# Patient Record
Sex: MALE | Race: WHITE | Employment: FULL TIME | URBAN - METROPOLITAN AREA
[De-identification: names, ages, dates, MRNs, and addresses within clinical notes are randomized per-mention and may not be internally consistent; named-entity substitution may affect disease eponyms.]

---

## 2022-02-06 ENCOUNTER — APPOINTMENT (OUTPATIENT)
Dept: CT IMAGING | Age: 67
DRG: 101 | End: 2022-02-06
Payer: MEDICARE

## 2022-02-06 ENCOUNTER — HOSPITAL ENCOUNTER (INPATIENT)
Age: 67
LOS: 2 days | Discharge: HOME | DRG: 101 | End: 2022-02-08
Attending: EMERGENCY MEDICINE | Admitting: INTERNAL MEDICINE
Payer: MEDICARE

## 2022-02-06 ENCOUNTER — APPOINTMENT (OUTPATIENT)
Dept: GENERAL RADIOLOGY | Age: 67
DRG: 101 | End: 2022-02-06
Payer: MEDICARE

## 2022-02-06 DIAGNOSIS — E11.65 TYPE 2 DIABETES MELLITUS WITH HYPERGLYCEMIA, WITH LONG-TERM CURRENT USE OF INSULIN (HCC): ICD-10-CM

## 2022-02-06 DIAGNOSIS — R55 SYNCOPE AND COLLAPSE: Primary | ICD-10-CM

## 2022-02-06 DIAGNOSIS — R09.02 HYPOXIA: ICD-10-CM

## 2022-02-06 DIAGNOSIS — E87.20 LACTIC ACIDOSIS: ICD-10-CM

## 2022-02-06 DIAGNOSIS — Z79.4 TYPE 2 DIABETES MELLITUS WITH HYPERGLYCEMIA, WITH LONG-TERM CURRENT USE OF INSULIN (HCC): ICD-10-CM

## 2022-02-06 LAB
A/G RATIO: 1.4 (ref 1.1–2.2)
ALBUMIN SERPL-MCNC: 4.5 G/DL (ref 3.4–5)
ALP BLD-CCNC: 98 U/L (ref 40–129)
ALT SERPL-CCNC: 25 U/L (ref 10–40)
ANION GAP SERPL CALCULATED.3IONS-SCNC: 10 MMOL/L (ref 3–16)
ANION GAP SERPL CALCULATED.3IONS-SCNC: 27 MMOL/L (ref 3–16)
APTT: 40.9 SEC (ref 26.2–38.6)
AST SERPL-CCNC: 20 U/L (ref 15–37)
BASE EXCESS VENOUS: -7.9 MMOL/L
BILIRUB SERPL-MCNC: 0.7 MG/DL (ref 0–1)
BILIRUBIN DIRECT: <0.2 MG/DL (ref 0–0.3)
BILIRUBIN, INDIRECT: NORMAL MG/DL (ref 0–1)
BUN BLDV-MCNC: 18 MG/DL (ref 7–20)
BUN BLDV-MCNC: 25 MG/DL (ref 7–20)
CALCIUM SERPL-MCNC: 7.8 MG/DL (ref 8.3–10.6)
CALCIUM SERPL-MCNC: 9.2 MG/DL (ref 8.3–10.6)
CARBOXYHEMOGLOBIN: 1.2 %
CHLORIDE BLD-SCNC: 100 MMOL/L (ref 99–110)
CHLORIDE BLD-SCNC: 92 MMOL/L (ref 99–110)
CO2: 16 MMOL/L (ref 21–32)
CO2: 25 MMOL/L (ref 21–32)
CREAT SERPL-MCNC: 0.9 MG/DL (ref 0.8–1.3)
CREAT SERPL-MCNC: 1.3 MG/DL (ref 0.8–1.3)
ETHANOL: NORMAL MG/DL (ref 0–0.08)
GFR AFRICAN AMERICAN: >60
GFR AFRICAN AMERICAN: >60
GFR NON-AFRICAN AMERICAN: 55
GFR NON-AFRICAN AMERICAN: >60
GLUCOSE BLD-MCNC: 131 MG/DL (ref 70–99)
GLUCOSE BLD-MCNC: 198 MG/DL (ref 70–99)
GLUCOSE BLD-MCNC: 230 MG/DL (ref 70–99)
GLUCOSE BLD-MCNC: 262 MG/DL (ref 70–99)
GLUCOSE BLD-MCNC: 87 MG/DL (ref 70–99)
HCO3 VENOUS: 20 MMOL/L (ref 23–29)
INR BLD: 0.95 (ref 0.88–1.12)
LACTIC ACID, SEPSIS: 12.6 MMOL/L (ref 0.4–1.9)
LACTIC ACID, SEPSIS: 3.2 MMOL/L (ref 0.4–1.9)
LACTIC ACID: 1.3 MMOL/L (ref 0.4–2)
METHEMOGLOBIN VENOUS: 0.5 %
O2 SAT, VEN: 56 %
O2 THERAPY: ABNORMAL
PCO2, VEN: 47.6 MMHG (ref 40–50)
PERFORMED ON: ABNORMAL
PERFORMED ON: ABNORMAL
PERFORMED ON: NORMAL
PH VENOUS: 7.23 (ref 7.35–7.45)
PO2, VEN: 35 MMHG
POTASSIUM REFLEX MAGNESIUM: 3.8 MMOL/L (ref 3.5–5.1)
POTASSIUM REFLEX MAGNESIUM: 4.2 MMOL/L (ref 3.5–5.1)
PROCALCITONIN: 0.05 NG/ML (ref 0–0.15)
PROTHROMBIN TIME: 10.7 SEC (ref 9.9–12.7)
SARS-COV-2, NAAT: NOT DETECTED
SODIUM BLD-SCNC: 135 MMOL/L (ref 136–145)
SODIUM BLD-SCNC: 135 MMOL/L (ref 136–145)
TCO2 CALC VENOUS: 21 MMOL/L
TOTAL CK: 405 U/L (ref 39–308)
TOTAL PROTEIN: 7.8 G/DL (ref 6.4–8.2)
TROPONIN: <0.01 NG/ML

## 2022-02-06 PROCEDURE — 71045 X-RAY EXAM CHEST 1 VIEW: CPT

## 2022-02-06 PROCEDURE — 2580000003 HC RX 258: Performed by: INTERNAL MEDICINE

## 2022-02-06 PROCEDURE — 72125 CT NECK SPINE W/O DYE: CPT

## 2022-02-06 PROCEDURE — 83036 HEMOGLOBIN GLYCOSYLATED A1C: CPT

## 2022-02-06 PROCEDURE — 71260 CT THORAX DX C+: CPT

## 2022-02-06 PROCEDURE — 99284 EMERGENCY DEPT VISIT MOD MDM: CPT

## 2022-02-06 PROCEDURE — 90471 IMMUNIZATION ADMIN: CPT | Performed by: EMERGENCY MEDICINE

## 2022-02-06 PROCEDURE — G0378 HOSPITAL OBSERVATION PER HR: HCPCS

## 2022-02-06 PROCEDURE — 87635 SARS-COV-2 COVID-19 AMP PRB: CPT

## 2022-02-06 PROCEDURE — 36415 COLL VENOUS BLD VENIPUNCTURE: CPT

## 2022-02-06 PROCEDURE — 96372 THER/PROPH/DIAG INJ SC/IM: CPT

## 2022-02-06 PROCEDURE — 2580000003 HC RX 258: Performed by: EMERGENCY MEDICINE

## 2022-02-06 PROCEDURE — 96365 THER/PROPH/DIAG IV INF INIT: CPT

## 2022-02-06 PROCEDURE — 83605 ASSAY OF LACTIC ACID: CPT

## 2022-02-06 PROCEDURE — 6370000000 HC RX 637 (ALT 250 FOR IP): Performed by: INTERNAL MEDICINE

## 2022-02-06 PROCEDURE — 85730 THROMBOPLASTIN TIME PARTIAL: CPT

## 2022-02-06 PROCEDURE — 85610 PROTHROMBIN TIME: CPT

## 2022-02-06 PROCEDURE — 82803 BLOOD GASES ANY COMBINATION: CPT

## 2022-02-06 PROCEDURE — 93005 ELECTROCARDIOGRAM TRACING: CPT | Performed by: EMERGENCY MEDICINE

## 2022-02-06 PROCEDURE — 6360000002 HC RX W HCPCS: Performed by: INTERNAL MEDICINE

## 2022-02-06 PROCEDURE — 82077 ASSAY SPEC XCP UR&BREATH IA: CPT

## 2022-02-06 PROCEDURE — 96367 TX/PROPH/DG ADDL SEQ IV INF: CPT

## 2022-02-06 PROCEDURE — 80053 COMPREHEN METABOLIC PANEL: CPT

## 2022-02-06 PROCEDURE — 84484 ASSAY OF TROPONIN QUANT: CPT

## 2022-02-06 PROCEDURE — 6360000004 HC RX CONTRAST MEDICATION: Performed by: EMERGENCY MEDICINE

## 2022-02-06 PROCEDURE — 6360000002 HC RX W HCPCS: Performed by: EMERGENCY MEDICINE

## 2022-02-06 PROCEDURE — 84145 PROCALCITONIN (PCT): CPT

## 2022-02-06 PROCEDURE — 1200000000 HC SEMI PRIVATE

## 2022-02-06 PROCEDURE — 90715 TDAP VACCINE 7 YRS/> IM: CPT | Performed by: EMERGENCY MEDICINE

## 2022-02-06 PROCEDURE — 70450 CT HEAD/BRAIN W/O DYE: CPT

## 2022-02-06 PROCEDURE — 82550 ASSAY OF CK (CPK): CPT

## 2022-02-06 RX ORDER — ACETAMINOPHEN 650 MG/1
650 SUPPOSITORY RECTAL EVERY 6 HOURS PRN
Status: DISCONTINUED | OUTPATIENT
Start: 2022-02-06 | End: 2022-02-08 | Stop reason: HOSPADM

## 2022-02-06 RX ORDER — HYDROCHLOROTHIAZIDE 25 MG/1
25 TABLET ORAL DAILY
Status: DISCONTINUED | OUTPATIENT
Start: 2022-02-06 | End: 2022-02-08 | Stop reason: HOSPADM

## 2022-02-06 RX ORDER — DEXTROSE MONOHYDRATE 25 G/50ML
12.5 INJECTION, SOLUTION INTRAVENOUS PRN
Status: DISCONTINUED | OUTPATIENT
Start: 2022-02-06 | End: 2022-02-08 | Stop reason: HOSPADM

## 2022-02-06 RX ORDER — DEXTROSE MONOHYDRATE 50 MG/ML
100 INJECTION, SOLUTION INTRAVENOUS PRN
Status: DISCONTINUED | OUTPATIENT
Start: 2022-02-06 | End: 2022-02-08 | Stop reason: HOSPADM

## 2022-02-06 RX ORDER — ONDANSETRON 2 MG/ML
4 INJECTION INTRAMUSCULAR; INTRAVENOUS EVERY 6 HOURS PRN
Status: DISCONTINUED | OUTPATIENT
Start: 2022-02-06 | End: 2022-02-08 | Stop reason: HOSPADM

## 2022-02-06 RX ORDER — M-VIT,TX,IRON,MINS/CALC/FOLIC 27MG-0.4MG
1 TABLET ORAL DAILY
COMMUNITY

## 2022-02-06 RX ORDER — POTASSIUM CHLORIDE 7.45 MG/ML
10 INJECTION INTRAVENOUS PRN
Status: DISCONTINUED | OUTPATIENT
Start: 2022-02-06 | End: 2022-02-08 | Stop reason: HOSPADM

## 2022-02-06 RX ORDER — NICOTINE POLACRILEX 4 MG
15 LOZENGE BUCCAL PRN
Status: DISCONTINUED | OUTPATIENT
Start: 2022-02-06 | End: 2022-02-08 | Stop reason: HOSPADM

## 2022-02-06 RX ORDER — ASPIRIN 325 MG
325 TABLET ORAL DAILY
Status: DISCONTINUED | OUTPATIENT
Start: 2022-02-06 | End: 2022-02-08 | Stop reason: HOSPADM

## 2022-02-06 RX ORDER — HYDROCHLOROTHIAZIDE 25 MG/1
25 TABLET ORAL DAILY
COMMUNITY
Start: 2021-09-07

## 2022-02-06 RX ORDER — INSULIN LISPRO 100 [IU]/ML
INJECTION, SOLUTION INTRAVENOUS; SUBCUTANEOUS
COMMUNITY
Start: 2022-01-13

## 2022-02-06 RX ORDER — OMEPRAZOLE 20 MG/1
20 CAPSULE, DELAYED RELEASE ORAL 2 TIMES DAILY PRN
COMMUNITY
Start: 2021-03-01

## 2022-02-06 RX ORDER — LEVETIRACETAM 10 MG/ML
1000 INJECTION INTRAVASCULAR ONCE
Status: COMPLETED | OUTPATIENT
Start: 2022-02-06 | End: 2022-02-06

## 2022-02-06 RX ORDER — SODIUM CHLORIDE 9 MG/ML
25 INJECTION, SOLUTION INTRAVENOUS PRN
Status: DISCONTINUED | OUTPATIENT
Start: 2022-02-06 | End: 2022-02-08 | Stop reason: HOSPADM

## 2022-02-06 RX ORDER — LISINOPRIL 40 MG/1
40 TABLET ORAL DAILY
COMMUNITY
Start: 2021-02-22

## 2022-02-06 RX ORDER — INSULIN GLARGINE 100 [IU]/ML
INJECTION, SOLUTION SUBCUTANEOUS
COMMUNITY
Start: 2021-12-01

## 2022-02-06 RX ORDER — DULAGLUTIDE 1.5 MG/.5ML
1.5 INJECTION, SOLUTION SUBCUTANEOUS
COMMUNITY
Start: 2021-08-04

## 2022-02-06 RX ORDER — LISINOPRIL 40 MG/1
40 TABLET ORAL DAILY
Status: DISCONTINUED | OUTPATIENT
Start: 2022-02-06 | End: 2022-02-08 | Stop reason: HOSPADM

## 2022-02-06 RX ORDER — CARVEDILOL 25 MG/1
25 TABLET ORAL 2 TIMES DAILY
Status: DISCONTINUED | OUTPATIENT
Start: 2022-02-06 | End: 2022-02-08 | Stop reason: HOSPADM

## 2022-02-06 RX ORDER — PROMETHAZINE HYDROCHLORIDE 25 MG/1
12.5 TABLET ORAL EVERY 6 HOURS PRN
Status: DISCONTINUED | OUTPATIENT
Start: 2022-02-06 | End: 2022-02-08 | Stop reason: HOSPADM

## 2022-02-06 RX ORDER — ACETAMINOPHEN 325 MG/1
650 TABLET ORAL EVERY 6 HOURS PRN
Status: DISCONTINUED | OUTPATIENT
Start: 2022-02-06 | End: 2022-02-08 | Stop reason: HOSPADM

## 2022-02-06 RX ORDER — POTASSIUM CHLORIDE 20 MEQ/1
40 TABLET, EXTENDED RELEASE ORAL PRN
Status: DISCONTINUED | OUTPATIENT
Start: 2022-02-06 | End: 2022-02-08 | Stop reason: HOSPADM

## 2022-02-06 RX ORDER — INSULIN GLARGINE 100 [IU]/ML
25 INJECTION, SOLUTION SUBCUTANEOUS NIGHTLY
Status: DISCONTINUED | OUTPATIENT
Start: 2022-02-06 | End: 2022-02-08 | Stop reason: HOSPADM

## 2022-02-06 RX ORDER — SODIUM CHLORIDE 0.9 % (FLUSH) 0.9 %
10 SYRINGE (ML) INJECTION EVERY 12 HOURS SCHEDULED
Status: DISCONTINUED | OUTPATIENT
Start: 2022-02-06 | End: 2022-02-08 | Stop reason: HOSPADM

## 2022-02-06 RX ORDER — 0.9 % SODIUM CHLORIDE 0.9 %
30 INTRAVENOUS SOLUTION INTRAVENOUS ONCE
Status: COMPLETED | OUTPATIENT
Start: 2022-02-06 | End: 2022-02-06

## 2022-02-06 RX ORDER — ATORVASTATIN CALCIUM 80 MG/1
80 TABLET, FILM COATED ORAL DAILY
Status: DISCONTINUED | OUTPATIENT
Start: 2022-02-06 | End: 2022-02-08 | Stop reason: HOSPADM

## 2022-02-06 RX ORDER — ASPIRIN 325 MG
325 TABLET ORAL DAILY
COMMUNITY
Start: 2021-08-02

## 2022-02-06 RX ORDER — SODIUM CHLORIDE 0.9 % (FLUSH) 0.9 %
10 SYRINGE (ML) INJECTION PRN
Status: DISCONTINUED | OUTPATIENT
Start: 2022-02-06 | End: 2022-02-08 | Stop reason: HOSPADM

## 2022-02-06 RX ORDER — PANTOPRAZOLE SODIUM 40 MG/1
40 TABLET, DELAYED RELEASE ORAL
Status: DISCONTINUED | OUTPATIENT
Start: 2022-02-07 | End: 2022-02-08 | Stop reason: HOSPADM

## 2022-02-06 RX ORDER — ATORVASTATIN CALCIUM 80 MG/1
80 TABLET, FILM COATED ORAL DAILY
COMMUNITY
Start: 2021-04-12

## 2022-02-06 RX ORDER — CARVEDILOL 25 MG/1
25 TABLET ORAL 2 TIMES DAILY
COMMUNITY
Start: 2021-12-09

## 2022-02-06 RX ORDER — MAGNESIUM SULFATE IN WATER 40 MG/ML
2000 INJECTION, SOLUTION INTRAVENOUS PRN
Status: DISCONTINUED | OUTPATIENT
Start: 2022-02-06 | End: 2022-02-08 | Stop reason: HOSPADM

## 2022-02-06 RX ADMIN — TETANUS TOXOID, REDUCED DIPHTHERIA TOXOID AND ACELLULAR PERTUSSIS VACCINE, ADSORBED 0.5 ML: 5; 2.5; 8; 8; 2.5 SUSPENSION INTRAMUSCULAR at 13:40

## 2022-02-06 RX ADMIN — INSULIN LISPRO 3 UNITS: 100 INJECTION, SOLUTION INTRAVENOUS; SUBCUTANEOUS at 23:22

## 2022-02-06 RX ADMIN — LISINOPRIL 40 MG: 40 TABLET ORAL at 21:56

## 2022-02-06 RX ADMIN — CEFTRIAXONE 1000 MG: 1 INJECTION, POWDER, FOR SOLUTION INTRAMUSCULAR; INTRAVENOUS at 13:21

## 2022-02-06 RX ADMIN — Medication 10 ML: at 21:58

## 2022-02-06 RX ADMIN — SODIUM CHLORIDE 3033 ML: 9 INJECTION, SOLUTION INTRAVENOUS at 12:51

## 2022-02-06 RX ADMIN — AZITHROMYCIN 500 MG: 500 INJECTION, POWDER, LYOPHILIZED, FOR SOLUTION INTRAVENOUS at 13:55

## 2022-02-06 RX ADMIN — PROMETHAZINE HYDROCHLORIDE 12.5 MG: 25 TABLET ORAL at 21:53

## 2022-02-06 RX ADMIN — IOPAMIDOL 75 ML: 755 INJECTION, SOLUTION INTRAVENOUS at 13:03

## 2022-02-06 RX ADMIN — INSULIN GLARGINE 25 UNITS: 100 INJECTION, SOLUTION SUBCUTANEOUS at 22:02

## 2022-02-06 RX ADMIN — ENOXAPARIN SODIUM 40 MG: 100 INJECTION SUBCUTANEOUS at 22:00

## 2022-02-06 RX ADMIN — HYDROCHLOROTHIAZIDE 25 MG: 25 TABLET ORAL at 21:56

## 2022-02-06 RX ADMIN — ASPIRIN 325 MG ORAL TABLET 325 MG: 325 PILL ORAL at 21:57

## 2022-02-06 RX ADMIN — CARVEDILOL 25 MG: 25 TABLET, FILM COATED ORAL at 21:56

## 2022-02-06 RX ADMIN — LEVETIRACETAM 1000 MG: 10 INJECTION, SOLUTION INTRAVENOUS at 13:00

## 2022-02-06 RX ADMIN — ATORVASTATIN CALCIUM 80 MG: 80 TABLET, FILM COATED ORAL at 21:56

## 2022-02-06 ASSESSMENT — PAIN SCALES - GENERAL: PAINLEVEL_OUTOF10: 0

## 2022-02-06 NOTE — ED PROVIDER NOTES
[address] [facility]      Pt Name: Miriam Valladares  MRN: [id number]  [place] 1955  Date of evaluation: 2/6/2022  Provider: Elena Cueto DO    CHIEF COMPLAINT       Chief Complaint   Patient presents with    Loss of Consciousness     unwitnessed. found at home. O2 sat 80s on RA. placed on NRB by squad. tounge lac noted. 99% on RA in ED. pt is awake and alert in ED. post ictal. pt reports hx of diabetes. denies seizure hx.  Head Injury         HISTORY OF PRESENT ILLNESS   (Location/Symptom, Timing/Onset, Context/Setting, Quality, Duration, Modifying Factors, Severity)  Note limiting factors. Miriam Valladares is a 69 y.o. male who presents to the emergency department with a complaint of [place]ub[place] seizure. The patient states that he did not have a seizure. He states that he got up this morning at 6 AM and checked his blood sugar. He states it was 276. He recalls that a friend was coming over at approximately 10 AM to take him to the grocery to go shopping. He remembers sitting on the edge of the bed and getting dressed, lost his balance, and fell. He did hit his head on the floor but denies any loss of consciousness. He remembers calling out for help but no one answered. He states that he was at home alone when this happened. He states that eventually he was able to call 911 for assistance. He denies any other injuries. He did sustain some abrasions to his hands and knees. Does report a history of a prior stroke but denies any residual deficits. He does take aspirin 81 mg daily. He denies any headache, vision change, vision loss, speech change, dizziness, vertigo, lightheadedness, focal weakness, facial droop, numbness. He denies any associated chest pain, heaviness, pressure, tightness, or shortness of breath. Paramedics report that his oxygen saturation was in the 80s on room air.   He does report a history of obstructive sleep apnea and wears CPAP at night but does not wear oxygen during the day. He denies any smoking history or history of COPD or asthma. He denies any recent illness. He denies any exposure to Covid. He denies any cough or sputum production. He denies any abdominal pain back pain flank pain. He denies any dysuria hematuria frequency urgency. He denies any melena or hematochezia. He denies any drug or alcohol use. Nursing Notes were reviewed. HPI        REVIEW OF SYSTEMS    (2-9 systems for level 4, 10 or more for level 5)       Constitutional: Negative for fever or chills. HENT: Negative for rhinorrhea and sore throat. Eyes: Negative for redness or drainage. Respiratory: Negative for shortness of breath or dyspnea on exertion. Cardiovascular: Negative for chest pain. Gastrointestinal: Negative for abdominal pain. Negative for vomiting or diarrhea. Genitourinary: Negative for flank pain. Negative for dysuria. Negative for hematuria. Neurological: Negative for headache. Musculoskeletal:  Negative edema. Hematological: Negative for adenopathy. All systems are reviewed and are negative except for those listed above in the history of present illness and ROS. PAST MEDICAL HISTORY     Past Medical History:   Diagnosis Date    Diabetes mellitus ([facility] [address]     Hypertension     Stroke [facility])          SURGICAL HISTORY     History reviewed. No pertinent surgical history. CURRENT MEDICATIONS       Previous Medications    No medications on file       ALLERGIES     Penicillins    FAMILY HISTORY     History reviewed. No pertinent family history. SOCIAL HISTORY       Social History     Socioeconomic History    Marital status:       Spouse name: None    Number of children: None    Years of education: None    Highest education level: None   Occupational History    None   Tobacco Use    Smoking status: Never Smoker    Smokeless tobacco: Never Used Substance and Sexual Activity    Alcohol use: Not Currently    Drug use: Never    Sexual activity: Not Currently   Other Topics Concern    None   Social History Narrative    None     Social Determinants of Health     Financial Resource Strain:     Difficulty of Paying Living Expenses: Not on file   Food Insecurity:     Worried About Running Out of Food in the Last Year: Not on file    Tawanna of Food in the Last Year: Not on file   Transportation Needs:     Lack of Transportation (Medical): Not on file    Lack of Transportation (Non-Medical): Not on file   Physical Activity:     Days of Exercise per Week: Not on file    Minutes of Exercise per Session: Not on file   Stress:     Feeling of Stress : Not on file   Social Connections:     Frequency of Communication with Friends and Family: Not on file    Frequency of Social Gatherings with Friends and Family: Not on file    Attends Mandaen Services: Not on file    Active Member of [address] [place] [address] or Organizations: Not on file    Attends Club or Organization Meetings: Not on file    Marital Status: Not on file   Intimate Partner Violence:     Fear of Current or Ex-Partner: Not on file    Emotionally Abused: Not on file    Physically Abused: Not on file    Sexually Abused: Not on file   Housing Stability:     Unable to Pay for Housing in the Last Year: Not on file    Number of [place] in the Last Year: Not on file    Unstable Housing in the Last Year: Not on file       SCREENINGS             PHYSICAL EXAM    (up to 7 for level 4, 8 or more for level 5)     ED Triage Vitals   BP Temp Temp Source Pulse Resp SpO2 Height Weight   -- 02/06/22 1052 02/06/22 1052 02/06/22 1052 02/06/22 1100 02/06/22 1052 -- 02/06/22 1101    97.6 °F (36.4 °C) Oral 101 15 98 %  222 lb 14.2 oz (101.1 kg)         Physical Exam   Constitutional: Awake and alert. Answers all questions appropriately.   He does have some difficulty with communication secondary to hearing loss which is consistent with his baseline. Head: No visible evidence of trauma. Normocephalic. Eyes: Pupils equal and reactive. No photophobia. Conjunctiva normal.    HENT: Oral mucosa moist.  Airway patent. Pharynx without erythema. Nares were clear. Neck:  Soft and supple. Nontender. Point or axial tenderness. Mild discomfort with range of motion. Heart:  Regular rate and rhythm. No murmur. Lungs:  Clear to auscultation. No wheezes, rales, or ronchi. No conversational dyspnea or accessory muscle use. Chest: Chest wall non-tender. No evidence of trauma. Abdomen:  Soft, obese, nondistended, bowel sounds present. Nontender. No guarding rigidity or rebound. No masses. Musculoskeletal: Extremities non-tender with full range of motion. Radial and dorsalis pedis pulses were intact. No calf tenderness erythema or edema. Pelvis stable and nontender. Scattered abrasions to the prepatellar surface of both knees and to the left hand but no bony tenderness or deformity. Neurological: Alert and oriented x 3. GCS 15. Answers all questions appropriately. Some difficulty with hearing loss. No dysarthria. No aphasia. No pronator drift. Negative finger-nose. Negative heel-to-shin. Able to lift all extremities off the bed without difficulty. Speech clear. Cranial nerves II-XII intact. No facial droop. No acute focal motor or sensory deficits. Skin: Skin is warm and dry. No rash. Lymphatic:  No lympadenopathy. Psychiatric: Normal mood and affect. Behavior is normal.         DIAGNOSTIC RESULTS     EKG: All EKG's are interpreted by the Emergency Department Physician who either signs or Co-signs this chart in the absence of a cardiologist.    Normal sinus rhythm. First-degree AV block. Rate 98. MA interval 220 ms. QRS duration 118 ms. QTc 446 ms. R Axis V degrees. No ST elevation. Incomplete right bundle branch block.     RADIOLOGY:   Non-plain film images such as CT, Ultrasound and MRI are read by the radiologist. Plain radiographic images are visualized and preliminarily interpreted by the emergency physician with the below findings:        Interpretation per the Radiologist below, if available at the time of this note:    CT CERVICAL SPINE WO CONTRAST   Final Result      1. No evidence of fracture with multilevel degenerative changes as described. 2.  Mild left sphenoid chronic sinusitis. CT CHEST PULMONARY EMBOLISM W CONTRAST   Final Result      1. No evidence of acute pulmonary emboli. 2.  Otherwise no acute pathology noted. XR CHEST PORTABLE   Final Result   Cardiomegaly with atelectasis/hazy densities in lung bases. Clinical   assessment is recommended to evaluate for viral infection.          CT HEAD WO CONTRAST   Final Result   Extensive low-attenuation in the right temporoparietal region could represent   consent represent acute or subacute ischemia      No intracranial hemorrhage identified               ED BEDSIDE ULTRASOUND:   Performed by ED Physician - none    LABS:  Labs Reviewed   LACTATE, SEPSIS - Abnormal; Notable for the following components:       Result Value    Lactic Acid, Sepsis 12.6 (*)     All other components within normal limits    Narrative:     Rufina Barajas tel. [phone],  Chemistry results called to and read back by Marilyn Penaloza RN, 02/06/2022  12:20, by Jay Kirk  Performed at:  [address] [address]   Phone [phone]   LACTATE, SEPSIS - Abnormal; Notable for the following components:    Lactic Acid, Sepsis 3.2 (*)     All other components within normal limits    Narrative:     Performed at:  [address] [address]   Phone [phone]   BLOOD GAS, VENOUS - Abnormal; Notable for the following components:    pH, [place] 7.228 (*)     HCO3, Venous 20 (*)     All other components within normal limits Narrative:     Performed at:  [facility]  [address], [place] [address] [address]   Phone [phone]   COMPREHENSIVE METABOLIC PANEL W/ REFLEX TO MG FOR LOW K - Abnormal; Notable for the following components:    Sodium 135 (*)     Chloride 92 (*)     CO2 16 (*)     Anion Gap 27 (*)     Glucose 230 (*)     BUN 25 (*)     GFR Non- 55 (*)     All other components within normal limits    Narrative:     Performed at:  [facility]  [address], [facility] [place] [address]   Phone [phone]   APTT - Abnormal; Notable for the following components:    aPTT 40.9 (*)     All other components within normal limits    Narrative:     Performed at:  [facility]  [address] [facility] [address] [address]   Phone [phone]   BASIC METABOLIC PANEL W/ REFLEX TO MG FOR LOW K - Abnormal; Notable for the following components:    Sodium 135 (*)     Glucose 131 (*)     Calcium 7.8 (*)     All other components within normal limits    Narrative:     Performed at:  [facility]  [address], [facility] [address] [address]   Phone [phone]   POCT GLUCOSE - Abnormal; Notable for the following components:    POC Glucose 198 (*)     All other components within normal limits    Narrative:     Performed at:  [facility]  [address], [place] [address]   Phone [phone], RAPID    Narrative:     Performed at:  [facility]  [address], [place] [address]   Phone [phone]   TROPONIN    Narrative:     Performed at:  [facility]  [address], [facility] [address] [address]   Phone [phone]   ETHANOL    Narrative:     Performed at:  [facility]  [address], [facility] [address] [address]   Phone [phone] HEPATIC FUNCTION PANEL    Narrative:     Performed at:  [facility]  [address] [place], [place] [address]   Phone [phone]   PROTIME-INR    Narrative:     Performed at:  [facility]  [address] [place], [place] [address]   Phone [phone]       All other labs were within normal range or not returned as of this dictation. EMERGENCY DEPARTMENT COURSE and DIFFERENTIAL DIAGNOSIS/MDM:   Vitals:    Vitals:    02/06/22 1501 02/06/22 1516 02/06/22 1531 02/06/22 1701   BP: 128/61 126/60 131/62 (!) 163/77   Pulse: 88 84 81 77   Resp: 14 14 15 14   Temp:       TempSrc:       SpO2:    97%   Weight:       Height:             MDM      Patient presents with a fall that occurred at home prior to arrival.  He adamantly denies any seizure or history of seizures. However, paramedics reported that the patient was confused and appeared postictal on their arrival.  No witnessed seizure activity. He did not bite his tongue. There was no incontinence. The patient's nurse who examined him in triage, reported that he seemed postictal on arrival, slow to respond to questions and seemed somewhat confused. Initially he was quite drowsy and aroused with conversation. While sleeping his oxygen saturation was in the upper 80s. He was placed on oxygen 2 L per nasal cannula. At the time of my examination he is now awake alert and oriented x3. This does raise the possibility that this may have been an unwitnessed seizure. He was sent for CT head without contrast.    12:40 PM: CT head reveals extensive low-attenuation in the right temporoparietal region which could represent acute or subacute ischemia.   I did review prior MRI from [facility] from March 2021 which reveals:  Impression    IMPRESSION:     1.  Acute right MCA territory infarct likely posterior division M2 and watershed with mild edema and gyral thickening, but no midline shift.   2.  No definite evidence of intracranial hemorrhage, within the limits of this motion limited exam.     I suspect that current CT findings are likely from his prior stroke. He does not appear to have any acute focal motor or sensory deficits. REASSESSMENT          40 4 PM: Glucose was 230. Creatinine 1.3. Bicarb is 16. Alcohol is 0. Lactate was elevated at 12. 6. No suspicion for infection. This may be consistent with seizure activity. pH was 7.228 with a PCO2 of 47. Chest x-ray showed some hazy densities in the bases. Therefore, he was sent for CT chest PE protocol. He was given normal saline 30 mL/kg. Prior 2D echocardiogram from [facility] from April 1, 2021 was reviewed. Study Conclusions     - Left ventricle: The cavity size is normal. Wall thickness was increased in a pattern of moderate     LVH. Systolic function was normal. The estimated ejection fraction was in the range of 55% to 60%.   Doppler parameters are consistent with abnormal left ventricular relaxation (grade 1 diastolic     dysfunction). - Aortic valve: The valve area by the velocity-time integral method is 2.2cm^2. The valve area by     the peak velocity method is 2.1cm^2. - Aorta: Aortic root 3.8 cm. - Aortic root: The aortic root is dilated. - Mitral valve: The valve area is 2.2cm^2. The valve area (LVOT continuity) is 2.2cm^2. - Atrial septum: No defect or patent foramen ovale was identified by visual or colour flow. - Pericardium, extracardiac: A trivial pericardial effusion is identified. Impressions:  No signiifcant chnage compared to prior echo.     4:01 PM: CT cervical spine is negative. CT chest reveals no evidence of pulmonary bliss. No evidence of aspiration. No infiltrates. Bicarb is 16. Glucose 230. Anion gap 27. I will go ahead and repeat a BMP after fluid administration. Suspicion for DKA is low. We will add a urinalysis. Covid is negative.   Troponin is normal. No suspicion for acute coronary syndrome. Initial lactate was elevated at 12. 6.  pH was 7.23. Initial chest x-ray did reveal hazy densities in the bases and he was given antibiotics to cover the possibility of community-acquired pneumonia. Rapid Covid test is negative. Repeat lactate is 3.2. Isai BMP at 4:41 PM reveals bicarb of 25 with a normalized anion gap. No acute neurological deficits. No suspicion for acute ischemic stroke. I suspect the patient likely did have a new onset seizure. However, given his hypoxia and acidosis, he will be admitted for further treatment and evaluation and observation. A call was placed to the hospitalist on-call for admission. CRITICAL CARE TIME   Total Critical Care time was 30 minutes, excluding separately reportable procedures. There was a high probability of clinically significant/life threatening deterioration in the patient's condition which required my urgent intervention. CONSULTS:  None    PROCEDURES:  Unless otherwise noted below, none     Procedures        FINAL IMPRESSION      1. Syncope and collapse    2. Hypoxia    3. Lactic acidosis          DISPOSITION/PLAN   DISPOSITION        PATIENT REFERRED TO:  No follow-up provider specified. DISCHARGE MEDICATIONS:  New Prescriptions    No medications on file     Controlled Substances Monitoring:     No flowsheet data found. (Please note that portions of this note were completed with a voice recognition program.  Efforts were made to edit the dictations but occasionally words are mis-transcribed. )    [id number] Milton Godinez DO (electronically signed)  Attending Emergency Physician          Jesica Tellez DO  02/06/22 4497

## 2022-02-06 NOTE — ED NOTES
Bed: [facility]  Expected date: 2/6/22  Expected time: 10:31 AM  Means of arrival: Ambulance  Comments:  [place] fall, seizure, post ictal, BG- 188, HR-60, initial O2 80% RA, now on a NRB     Anju Salas RN  02/06/22 1043

## 2022-02-06 NOTE — PROGRESS NOTES
Medication Reconciliation     List of medications patient is currently taking is complete. Source of information:   1. Conversation with patient at bedside  2. EPIC records        Notes regarding home medications:  1. Patient does not recall taking any home medications today. But remembers he took them yesterday. 2. Verified insulin dose and the day patient takes Trulicity. 3. Patient takes Centrum OTC: I added it as multivitamin on his med list.  4. No other OTC or supplement medications at this time.     Bucky Condon, Pharmacy Intern  2/6/2022 6:45 PM

## 2022-02-06 NOTE — ED NOTES
Upon pt waking up more becoming alert, pt reports the last thing he remembers is he was trying to get up and he fell off his bed, hit his head. Called 911 himself. Denies seizure hx. Reports HTN, diabetes, hx of stroke for which he takes 81 ASA daily.       Gómez Pope RN  02/06/22 4585

## 2022-02-06 NOTE — ED NOTES
Placed pt on 3L NC with no improvement of O2 sat at 88% on RA.    Turned up to 5L with O2 at 100%     Gómez Pope RN  02/06/22 6767

## 2022-02-06 NOTE — ED NOTES
Pt reports headache, moreso on the right side of the forehead as well as nausea. ED MD made aware.       Aundra Severe, RN  02/06/22 2325

## 2022-02-07 LAB
AMPHETAMINE SCREEN, URINE: NORMAL
ANION GAP SERPL CALCULATED.3IONS-SCNC: 12 MMOL/L (ref 3–16)
BARBITURATE SCREEN URINE: NORMAL
BENZODIAZEPINE SCREEN, URINE: NORMAL
BILIRUBIN URINE: NEGATIVE
BLOOD, URINE: NEGATIVE
BUN BLDV-MCNC: 14 MG/DL (ref 7–20)
CALCIUM SERPL-MCNC: 8.6 MG/DL (ref 8.3–10.6)
CANNABINOID SCREEN URINE: NORMAL
CHLORIDE BLD-SCNC: 103 MMOL/L (ref 99–110)
CLARITY: CLEAR
CO2: 25 MMOL/L (ref 21–32)
COCAINE METABOLITE SCREEN URINE: NORMAL
COLOR: YELLOW
CREAT SERPL-MCNC: 1.1 MG/DL (ref 0.8–1.3)
EKG ATRIAL RATE: 98 BPM
EKG DIAGNOSIS: NORMAL
EKG P AXIS: 40 DEGREES
EKG P-R INTERVAL: 222 MS
EKG Q-T INTERVAL: 350 MS
EKG QRS DURATION: 118 MS
EKG QTC CALCULATION (BAZETT): 446 MS
EKG R AXIS: 5 DEGREES
EKG T AXIS: 34 DEGREES
EKG VENTRICULAR RATE: 98 BPM
EPITHELIAL CELLS, UA: 1 /HPF (ref 0–5)
ESTIMATED AVERAGE GLUCOSE: 248.9 MG/DL
GFR AFRICAN AMERICAN: >60
GFR NON-AFRICAN AMERICAN: >60
GLUCOSE BLD-MCNC: 164 MG/DL (ref 70–99)
GLUCOSE BLD-MCNC: 193 MG/DL (ref 70–99)
GLUCOSE BLD-MCNC: 204 MG/DL (ref 70–99)
GLUCOSE BLD-MCNC: 238 MG/DL (ref 70–99)
GLUCOSE BLD-MCNC: 242 MG/DL (ref 70–99)
GLUCOSE URINE: 250 MG/DL
HBA1C MFR BLD: 10.3 %
HCT VFR BLD CALC: 41.6 % (ref 40.5–52.5)
HEMOGLOBIN: 14.1 G/DL (ref 13.5–17.5)
HYALINE CASTS: 3 /LPF (ref 0–8)
KETONES, URINE: NEGATIVE MG/DL
LEUKOCYTE ESTERASE, URINE: NEGATIVE
Lab: NORMAL
MCH RBC QN AUTO: 32.2 PG (ref 26–34)
MCHC RBC AUTO-ENTMCNC: 33.8 G/DL (ref 31–36)
MCV RBC AUTO: 95.2 FL (ref 80–100)
METHADONE SCREEN, URINE: NORMAL
MICROSCOPIC EXAMINATION: YES
NITRITE, URINE: NEGATIVE
OPIATE SCREEN URINE: NORMAL
OXYCODONE URINE: NORMAL
PDW BLD-RTO: 13.9 % (ref 12.4–15.4)
PERFORMED ON: ABNORMAL
PH UA: 6
PH UA: 6 (ref 5–8)
PHENCYCLIDINE SCREEN URINE: NORMAL
PLATELET # BLD: 197 K/UL (ref 135–450)
PMV BLD AUTO: 9 FL (ref 5–10.5)
POTASSIUM REFLEX MAGNESIUM: 3.7 MMOL/L (ref 3.5–5.1)
PROPOXYPHENE SCREEN: NORMAL
PROTEIN UA: 30 MG/DL
RBC # BLD: 4.37 M/UL (ref 4.2–5.9)
RBC UA: 1 /HPF (ref 0–4)
SODIUM BLD-SCNC: 140 MMOL/L (ref 136–145)
SPECIFIC GRAVITY UA: 1.01 (ref 1–1.03)
TSH REFLEX FT4: 0.51 UIU/ML (ref 0.27–4.2)
URINE TYPE: ABNORMAL
UROBILINOGEN, URINE: 0.2 E.U./DL
WBC # BLD: 7.2 K/UL (ref 4–11)
WBC UA: 0 /HPF (ref 0–5)

## 2022-02-07 PROCEDURE — 2580000003 HC RX 258: Performed by: INTERNAL MEDICINE

## 2022-02-07 PROCEDURE — 6370000000 HC RX 637 (ALT 250 FOR IP): Performed by: INTERNAL MEDICINE

## 2022-02-07 PROCEDURE — 97161 PT EVAL LOW COMPLEX 20 MIN: CPT

## 2022-02-07 PROCEDURE — 36415 COLL VENOUS BLD VENIPUNCTURE: CPT

## 2022-02-07 PROCEDURE — 1200000000 HC SEMI PRIVATE

## 2022-02-07 PROCEDURE — 94660 CPAP INITIATION&MGMT: CPT

## 2022-02-07 PROCEDURE — 94760 N-INVAS EAR/PLS OXIMETRY 1: CPT

## 2022-02-07 PROCEDURE — 84443 ASSAY THYROID STIM HORMONE: CPT

## 2022-02-07 PROCEDURE — 81001 URINALYSIS AUTO W/SCOPE: CPT

## 2022-02-07 PROCEDURE — 80048 BASIC METABOLIC PNL TOTAL CA: CPT

## 2022-02-07 PROCEDURE — 97116 GAIT TRAINING THERAPY: CPT

## 2022-02-07 PROCEDURE — 80307 DRUG TEST PRSMV CHEM ANLYZR: CPT

## 2022-02-07 PROCEDURE — 85027 COMPLETE CBC AUTOMATED: CPT

## 2022-02-07 PROCEDURE — 97166 OT EVAL MOD COMPLEX 45 MIN: CPT

## 2022-02-07 PROCEDURE — 97535 SELF CARE MNGMENT TRAINING: CPT

## 2022-02-07 PROCEDURE — 97530 THERAPEUTIC ACTIVITIES: CPT

## 2022-02-07 PROCEDURE — G0378 HOSPITAL OBSERVATION PER HR: HCPCS

## 2022-02-07 PROCEDURE — 93010 ELECTROCARDIOGRAM REPORT: CPT | Performed by: INTERNAL MEDICINE

## 2022-02-07 RX ADMIN — ASPIRIN 325 MG ORAL TABLET 325 MG: 325 PILL ORAL at 09:30

## 2022-02-07 RX ADMIN — CARVEDILOL 25 MG: 25 TABLET, FILM COATED ORAL at 18:43

## 2022-02-07 RX ADMIN — INSULIN LISPRO 3 UNITS: 100 INJECTION, SOLUTION INTRAVENOUS; SUBCUTANEOUS at 18:43

## 2022-02-07 RX ADMIN — HYDROCHLOROTHIAZIDE 25 MG: 25 TABLET ORAL at 09:30

## 2022-02-07 RX ADMIN — INSULIN LISPRO 4 UNITS: 100 INJECTION, SOLUTION INTRAVENOUS; SUBCUTANEOUS at 09:32

## 2022-02-07 RX ADMIN — Medication 10 ML: at 09:31

## 2022-02-07 RX ADMIN — Medication 10 ML: at 21:14

## 2022-02-07 RX ADMIN — LISINOPRIL 40 MG: 40 TABLET ORAL at 09:30

## 2022-02-07 RX ADMIN — INSULIN LISPRO 2 UNITS: 100 INJECTION, SOLUTION INTRAVENOUS; SUBCUTANEOUS at 21:15

## 2022-02-07 RX ADMIN — CARVEDILOL 25 MG: 25 TABLET, FILM COATED ORAL at 09:30

## 2022-02-07 RX ADMIN — ACETAMINOPHEN 650 MG: 325 TABLET ORAL at 21:13

## 2022-02-07 RX ADMIN — PANTOPRAZOLE SODIUM 40 MG: 40 TABLET, DELAYED RELEASE ORAL at 06:37

## 2022-02-07 RX ADMIN — INSULIN LISPRO 4 UNITS: 100 INJECTION, SOLUTION INTRAVENOUS; SUBCUTANEOUS at 12:01

## 2022-02-07 RX ADMIN — INSULIN GLARGINE 25 UNITS: 100 INJECTION, SOLUTION SUBCUTANEOUS at 21:15

## 2022-02-07 RX ADMIN — ATORVASTATIN CALCIUM 80 MG: 80 TABLET, FILM COATED ORAL at 09:30

## 2022-02-07 ASSESSMENT — PAIN SCALES - GENERAL
PAINLEVEL_OUTOF10: 0
PAINLEVEL_OUTOF10: 0

## 2022-02-07 NOTE — PROGRESS NOTES
Hospitalist Progress Note      PCP: No primary care provider on file. Chief Complaint. Patient is a 15-year-old male with past medical history of diabetes mellitus hypertension who presents to the hospital for possible seizure episode. According to the patient he had a fall after he lost his balance. He denies remembering seizure episode however at time of arrival of the EMS patient was found confused and likely postictal.  Patient otherwise denies chest pain shortness of breath nausea vomiting diarrhea constipation dysuria. Date of Admission: 2/6/2022      Subjective:   denies chest pain, nausea, vomiting, shortness of breath, fever or chills.  mention feels overall better    Medications:  Reviewed    Infusion Medications    sodium chloride      dextrose       Scheduled Medications    sodium chloride flush  10 mL IntraVENous 2 times per day    enoxaparin  40 mg SubCUTAneous Nightly    insulin lispro  0-12 Units SubCUTAneous TID WC    insulin lispro  0-6 Units SubCUTAneous Nightly    aspirin  325 mg Oral Daily    atorvastatin  80 mg Oral Daily    carvedilol  25 mg Oral BID    hydroCHLOROthiazide  25 mg Oral Daily    insulin glargine  25 Units SubCUTAneous Nightly    lisinopril  40 mg Oral Daily    pantoprazole  40 mg Oral QAM AC     PRN Meds: sodium chloride flush, sodium chloride, potassium chloride **OR** potassium alternative oral replacement **OR** potassium chloride, potassium chloride, magnesium sulfate, promethazine **OR** ondansetron, magnesium hydroxide, acetaminophen **OR** acetaminophen, glucose, dextrose, glucagon (rDNA), dextrose      Intake/Output Summary (Last 24 hours) at 2/7/2022 1220  Last data filed at 2/7/2022 0930  Gross per 24 hour   Intake 3443 ml   Output 1100 ml   Net 2343 ml       Physical Exam Performed:    /76   Pulse 75   Temp 97.9 °F (36.6 °C) (Oral)   Resp 18   Ht 5' 6\" (1.676 m)   Wt 222 lb 14.2 oz (101.1 kg)   SpO2 94%   BMI 35.97 kg/m² General appearance: No apparent distress,   HEENT:  Conjunctivae/corneas clear. Neck: Supple, with full range of motion. Respiratory:  Normal respiratory effort. Clear to auscultation, bilaterally without Rales/Wheezes/Rhonchi. Cardiovascular: Regular rate and rhythm with normal S1/S2 without murmurs or rubs  Abdomen: Soft, non-tender, non-distended, normal bowel sounds. Musculoskeletal: No cyanosis or edema bilaterally  Neurologic:  without any focal sensory/motor deficits. grossly non-focal.  Psychiatric: Alert and oriented, Normal mood  Peripheral Pulses: +2 palpable, equal bilaterally       Labs:   Recent Labs     02/07/22  0523   WBC 7.2   HGB 14.1   HCT 41.6        Recent Labs     02/06/22  1125 02/06/22  1641 02/07/22  0523   * 135* 140   K 4.2 3.8 3.7   CL 92* 100 103   CO2 16* 25 25   BUN 25* 18 14   CREATININE 1.3 0.9 1.1   CALCIUM 9.2 7.8* 8.6     Recent Labs     02/06/22  1125   AST 20   ALT 25   BILIDIR <0.2   BILITOT 0.7   ALKPHOS 98     Recent Labs     02/06/22  1125   INR 0.95     Recent Labs     02/06/22  1125 02/06/22  1935   CKTOTAL  --  405*   TROPONINI <0.01  --        Urinalysis:    No results found for: [employer], BACTERIA, RBCUA, BLOODU, SPECGRAV, GLUCOSEU    Radiology:  CT CERVICAL SPINE WO CONTRAST   Final Result      1. No evidence of fracture with multilevel degenerative changes as described. 2.  Mild left sphenoid chronic sinusitis. CT CHEST PULMONARY EMBOLISM W CONTRAST   Final Result      1. No evidence of acute pulmonary emboli. 2.  Otherwise no acute pathology noted. XR CHEST PORTABLE   Final Result   Cardiomegaly with atelectasis/hazy densities in lung bases. Clinical   assessment is recommended to evaluate for viral infection.          CT HEAD WO CONTRAST   Final Result   Extensive low-attenuation in the right temporoparietal region could represent   consent represent acute or subacute ischemia      No intracranial hemorrhage identified         MRI BRAIN WO CONTRAST    (Results Pending)         Assessment/Plan:    Active Hospital Problems    Diagnosis     Syncope and collapse [R55]        Patient is a 78-year-old male with past medical history of diabetes mellitus hypertension who presents to the hospital for possible seizure episode. According to the patient he had a fall after he lost his balance. He denies remembering seizure episode however at time of arrival of the EMS patient was found confused and likely postictal.  Patient otherwise denies chest pain shortness of breath nausea vomiting diarrhea constipation dysuria.     Assessment  Syncope and collapse, rule out seizure  Lactic acidosis  Acute hypoxia-resolved  Diabetes mellitus     Plan  Continue IV fluids given mild rhabdo, consulted neurology, will defer ordering EEG to neurology, PT/OT recs reviewed  CT head was performed in the emergency department shows extensive low-attenuation in the right temporoparietal region suspected for acute or subacute ischemia, will order MRI brain for better evaluation, PT/OT evaluation, aspirin, statin  Monitor on cardiac telemetry, hypoxia has resolved, currently on room air  Insulin sliding scale  Resume home medication  DVT prophylaxis-Lovenox  DVT Prophylaxis:  Diet: ADULT DIET;  Regular  Code Status: Full Code    PT/OT Eval Status: ordered    Dispo - EEG per neurology, and then DC, AEDs at discharge per neurology    Everett Blanco MD

## 2022-02-07 NOTE — PROGRESS NOTES
Pt admitted to room 4116 . Pt ambulated from stretcher to bed no devices tolerated well. Pt VSS. Pt oriented to room oriented to call light. Pt denies pain. Pt initiated on tele monitoring box 86.    Electronically signed by Eloina Mathew RN on 2/6/2022

## 2022-02-07 NOTE — PROGRESS NOTES
Occupational Therapy   Occupational Therapy Initial Assessment  Date: 2022   Patient Name: Yesi Cuba  MRN: [id number]     : 1955    Date of Service: 2022    Discharge Recommendations:  Home with assist PRN  OT Equipment Recommendations  Other: TBD    Assessment   Performance deficits / Impairments: Decreased functional mobility ; Decreased ADL status; Decreased safe awareness;Decreased high-level IADLs;Decreased endurance  Assessment: Pt is a 69 y.o. male admitted with Syncope and collapse, rule out seizure, Lactic acidosis. At baseline, pt lives alone in an apartment, independent ADLs, IADLs, and fxl mobility with no AD. Pt currently functioning below baseline d/t the above deficits, today requiring SBA/CGA fxl transfers/mobility with no AD, SBA grooming in stance at sink, and anticipate pt would require overall CGA for ADLs. Will cont to see on acute to address the above limitations and maximize pt's safety and independence. Anticipate pt will be safe to return home with assist prn at d/c. Pt denies need for home OT. Prognosis: Good  Decision Making: Medium Complexity  OT Education: OT Role;Plan of Care;ADL Adaptive Strategies;Transfer Training  Barriers to Learning: hearing  REQUIRES OT FOLLOW UP: Yes  Activity Tolerance  Activity Tolerance: Patient Tolerated treatment well  Safety Devices  Safety Devices in place: Yes  Type of devices: Call light within reach; Chair alarm in place; Left in chair;Gait belt;Nurse notified           Patient Diagnosis(es): The primary encounter diagnosis was Syncope and collapse. Diagnoses of Hypoxia and Lactic acidosis were also pertinent to this visit. has a past medical history of Diabetes mellitus ([facility] [address] Hypertension, and Stroke ([facility] [address] has no past surgical history on file. Restrictions  Restrictions/Precautions  Restrictions/Precautions: Up as Tolerated,Seizure,Fall Risk    Subjective   General  Chart Reviewed:  Yes  Additional Pertinent Hx: Per Malaika Valladares MD's H&P: \"Patient is a 58-year-old male with past medical history of diabetes mellitus hypertension who presents to the hospital for possible seizure episode. According to the patient he had a fall after he lost his balance. He denies remembering seizure episode however at time of arrival of the EMS patient was found confused and likely postictal. Patient otherwise denies chest pain shortness of breath nausea vomiting diarrhea constipation dysuria. \" CT head: \"Extensive low-attenuation in the right temporoparietal region could represent consent represent acute or subacute ischemia. No intracranial hemorrhage identified \"  Family / Caregiver Present: No  Referring Practitioner: Malaika Valladares MD  Diagnosis: Syncope and collapse, rule out seizure, Lactic acidosis  Subjective  Subjective: Pt met b/s for OT eval/tx. Pt in bed on arrival, agreeable to participate in therapy. Pt denies pain.     Orthostatic B/P and Pulse?: Yes  Blood Pressure Lyin/83  Pulse Lyin PER MINUTE  Blood Pressure Sittin/73  Pulse Sittin PER MINUTE  Blood Pressure Standin/78  Pulse Standin PER MINUTE  Orthostatic B/P and Pulse?: Yes    Social/Functional History  Social/Functional History  Lives With: Alone  Type of Home: Apartment (9th floor apartment)  Home Layout: One level (laundry on 8th floor, but takes laundry to [address] most of the time)  Home Access: Elevator,Level entry  Bathroom Shower/Tub: Tub/Shower unit  Bathroom Toilet: Standard  Bathroom Accessibility: Walker accessible  Home Equipment: Cane (BiPAP)  ADL Assistance: Independent  Homemaking Assistance: Independent  Ambulation Assistance: Independent (with no AD)  Transfer Assistance: Independent  Active : No  Patient's  Info: friends  Mode of Transportation: Bus  Occupation: Part time employment  Type of occupation: prepares taxes  IADL Comments: pt sleeps on air mattress  Additional Comments: Pt denies any other recent falls. Objective   Vision: Impaired  Vision Exceptions: Wears glasses for reading  Hearing: Exceptions to [facility]  Hearing Exceptions: Hard of hearing/hearing concerns; No hearing aid      Orientation  Overall Orientation Status: Within Functional Limits  Orientation Level: Oriented to person;Oriented to time;Oriented to place;Oriented to situation     Balance  Sitting Balance: Stand by assistance  Standing Balance: Stand by assistance  Functional Mobility  Functional - Mobility Device: No device  Activity: To/from bathroom  Assist Level: Contact guard assistance  Functional Mobility Comments: Pt completed fxl mobility ~10 ft, ~25 ft with no AD and SBA/CGA. Pt impulsive, but no LOB. ADL  Grooming: Stand by assistance (standing at sink to brush teeth, comb hair)  LE Dressing:  (SBA to don/doff socks, anticipate CGA for complete LB dressing)  Toileting:  (pt demo'd toilet transfer CGA, anticipate CGA for toileting)  Additional Comments: Anticipate pt is independent feeding, SBA/CGA bathing and dressing based on ROM/strength, balance, endurance.      Bed mobility  Rolling to Left: Independent  Rolling to Right: Independent  Supine to Sit: Independent  Sit to Supine: Unable to assess (the pt up to the chair)  Scooting: Independent  Comment: pt moves quickly and is impulsive when turning or getting out of the bed     Transfers  Sit to stand: Contact guard assistance;Stand by assistance  Stand to sit: Contact guard assistance;Stand by assistance     Cognition  Overall Cognitive Status: Exceptions  Safety Judgement: Decreased awareness of need for safety;Decreased awareness of need for assistance  Insights: Decreased awareness of deficits  Cognition Comment: needed instructions and instructions to be repeated due to being [facility]     Sensation  Overall Sensation Status: Impaired  Additional Comments: reports he has neuropathy in his feet R being worse than the L     LUE AROM (degrees)  LUE AROM : WFL  RUE AROM (degrees)  RUE AROM : WFL     LUE Strength  Gross LUE Strength: WFL  RUE Strength  Gross RUE Strength: WFL                   Plan   Plan  Times per week: 3-5  Current Treatment Recommendations: Balance Training,Functional Mobility Training,Safety Education & Training,Self-Care / ADL,Endurance Training,Equipment Evaluation, Education, & procurement,Neuromuscular Re-education      AM-PAC Score        AM-PAC Inpatient Daily Activity Raw Score: 21 (02/07/22 1019)  AM-PAC Inpatient ADL T-Scale Score : 44.27 (02/07/22 1019)  ADL Inpatient CMS 0-100% Score: 32.79 (02/07/22 1019)  ADL Inpatient CMS G-Code Modifier : Rosa Isela Shin (02/07/22 1019)    Goals  Short term goals  Time Frame for Short term goals: Prior to d/c:  Short term goal 1: Pt will bathe with mod I. Short term goal 2: Pt will toilet with mod I. Short term goal 3: Pt will toilet with mod I. Short term goal 4: Pt will complete fxl mobility and fxl transfers to/from ADL surfaces with mod I. Short term goal 5: Pt will tolerate standing >10 minutes for functional task with mod I.  Long term goals  Time Frame for Long term goals : STGs=LTGs  Patient Goals   Patient goals : to return home. Therapy Time   Individual Concurrent Group Co-treatment   Time In 0828         Time Out 0907         Minutes 39         Timed Code Treatment Minutes: 24 Minutes     This note to serve as OT d/c summary if pt is d/c-ed prior to next therapy session.     Doretha Richards, OTR/L [id number]

## 2022-02-07 NOTE — CARE COORDINATION
[facility]  Diabetes Education   Progress Note       NAME:  Miriam Valladares  MEDICAL RECORD NUMBER:  [id number]  AGE: 69 y.o. GENDER: male  : 1955  TODAY'S DATE:  2022    Subjective   Reason for Diabetic Education Evaluation and Assessment: general diabetes support    Pamella Vergara reports consistent medication taking at home with his Trulicity and Lantus. He tends to eat one large meal per day which leads to inconsistent monitoring and Lispro taking. Pamella Vergara is interested in reviewing \"what to eat\" and keeping a glucose log. Visit Type: evaluation      Miriam Valladares is a 69 y.o. male referred by:     [x] Physician  [] Nursing  [] Chart Review   [] Other:     PAST MEDICAL HISTORY        Diagnosis Date    Diabetes mellitus ([facility] [address]     Hypertension     Stroke ([facility]        PAST SURGICAL HISTORY    History reviewed. No pertinent surgical history. FAMILY HISTORY    History reviewed. No pertinent family history.     SOCIAL HISTORY    Social History     Tobacco Use    Smoking status: Never Smoker    Smokeless tobacco: Never Used   Substance Use Topics    Alcohol use: Not Currently    Drug use: Never       ALLERGIES    Allergies   Allergen Reactions    Penicillins        MEDICATIONS     sodium chloride flush  10 mL IntraVENous 2 times per day    enoxaparin  40 mg SubCUTAneous Nightly    insulin lispro  0-12 Units SubCUTAneous TID WC    insulin lispro  0-6 Units SubCUTAneous Nightly    aspirin  325 mg Oral Daily    atorvastatin  80 mg Oral Daily    carvedilol  25 mg Oral BID    hydroCHLOROthiazide  25 mg Oral Daily    insulin glargine  25 Units SubCUTAneous Nightly    lisinopril  40 mg Oral Daily    pantoprazole  40 mg Oral QAM AC       Objective        Patient Active Problem List   Diagnosis Code    Syncope and collapse R55        /76   Pulse 75   Temp 97.9 °F (36.6 °C) (Oral)   Resp 18   Ht 5' 6\" (1.676 m)   Wt 222 lb 14.2 oz (101.1 kg)   SpO2 treatment. Level of patient/caregiver understanding able to:        [] Verbalized Understanding   [] Demonstrated Understanding       [] Teach Back       [x] Needs Reinforcement     []  Other:           Plan        Ongoing diabetes education and blood glucose monitoring. Recommend follow up with [facility].       The following educational and support materials were provided:  · My contact information    · The Diabetes Education Program:  Planning Healthy Meals   · Academy of Nutrition and Dietetics handout - Carbohydrate Counting for People with Diabetes  · Blood Glucose Log Book                                           Discharge Plan:  Discharge needs: no prescription needs identified       Teaching Time Diabetes Education:  30 minutes     Electronically signed by Paula Koch on 2/7/2022 at 12:00 PM

## 2022-02-07 NOTE — PLAN OF CARE
Problem: Falls - Risk of:  Goal: Will remain free from falls  Description: Will remain free from falls  Outcome: Met This Shift  Goal: Absence of physical injury  Description: Absence of physical injury  Outcome: Met This Shift     Problem: SAFETY  Goal: Free from accidental physical injury  Outcome: Met This Shift     Problem: DAILY CARE  Goal: Daily care needs are met  Outcome: Met This Shift     Problem: SKIN INTEGRITY  Goal: Skin integrity is maintained or improved  Outcome: Ongoing     Problem: KNOWLEDGE DEFICIT  Goal: Patient/S.O. demonstrates understanding of disease process, treatment plan, medications, and discharge instructions.   Outcome: Ongoing

## 2022-02-07 NOTE — PROGRESS NOTES
Pt expresses desire to be discharged today. RN made Dr. Alfredo Dominguez aware per patient request, waiting on response.

## 2022-02-07 NOTE — CONSULTS
Neurology Consult Note  Reason for Consult: abnormal CT head scan    Chief complaint: fall    Dr Frank Diez MD asked me to see Yesi Square in consultation for evaluation of abnormal CT head scan    History of Present Illness:  Yesi Cuba is a 69 y.o. male who presents with fall. I obtained my information via interview w/ the patient, supplemented by chart review. The patient has a hx of a posterior R MCA stroke in March of last year. He really has no residual effect. He says yesterday around 0930 he was getting himself ready as his friend was coming over to help get him to the grocery store. He says he has bad ankles and when he was trying to put his pants on his feet sort of got tangled and pulled his legs out from under him. He fell saying he did strike his head. He is fairly adamant that he did not lose consciousness and immediately started calling for help. He has some tongue bites but suggests that his \"tongue gets in the way\" when he eats often. No incontinence. He was unable to get himself up for some reason but says he was capable of crawling to the phone to call 911. When EMS arrived he required supplemental oxygen. Apparently EMS felt he was confused and though perhaps he was post ictal.  Upon arrival here he was somewhat slow to respond and felt to be a bit confused. This improved during his time downstairs. CT head w/ R MCA encephalomalacia. No fevers. BP OK. Lactic acid was significantly elevated. CK slightly elevated. No other focal neurologic deficits. He was administered 1g of Keppra. Currently he feels fine w/out any specific complaints. He denies any hx of seizure. Medical History:  Past Medical History:   Diagnosis Date    Diabetes mellitus ([facility] [address]     Hypertension     Stroke [facility])      History reviewed. No pertinent surgical history.      Scheduled Meds:   sodium chloride flush  10 mL IntraVENous 2 times per day    enoxaparin  40 mg SubCUTAneous Nightly    insulin lispro  0-12 Units SubCUTAneous TID WC    insulin lispro  0-6 Units SubCUTAneous Nightly    aspirin  325 mg Oral Daily    atorvastatin  80 mg Oral Daily    carvedilol  25 mg Oral BID    hydroCHLOROthiazide  25 mg Oral Daily    insulin glargine  25 Units SubCUTAneous Nightly    lisinopril  40 mg Oral Daily    pantoprazole  40 mg Oral QAM AC     Medications Prior to Admission:   aspirin 325 MG tablet, Take 325 mg by mouth daily  atorvastatin (LIPITOR) 80 MG tablet, Take 80 mg by mouth daily  carvedilol (COREG) 25 MG tablet, Take 25 mg by mouth 2 times daily  glucose 4 g chewable tablet, Take 16 g by mouth as needed  Dulaglutide (TRULICITY) 1.5 YT/3.0XT SOPN, Inject 1.5 mg into the skin every 7 days ON TUESDAYS  hydroCHLOROthiazide (HYDRODIURIL) 25 MG tablet, Take 25 mg by mouth daily  insulin glargine (LANTUS SOLOSTAR) 100 UNIT/ML injection pen, INJECT 50 UNITS UNDER THE SKIN EVERY MORNING AND 55 UNITS UNDER THE SKIN EVERY EVENING  insulin lispro, 1 Unit Dial, 100 UNIT/ML SOPN, INJECT UNDER THE SKIN BEFORE EVERY MEAL PER SLIDING SCALE. MAX OF 65 UNITS DAILY  lisinopril (PRINIVIL;ZESTRIL) 40 MG tablet, Take 40 mg by mouth daily  metFORMIN (GLUCOPHAGE) 1000 MG tablet, Take 1,000 mg by mouth 2 times daily  omeprazole (PRILOSEC) 20 MG delayed release capsule, Take 20 mg by mouth 2 times daily as needed  Multiple Vitamins-Minerals (THERAPEUTIC MULTIVITAMIN-MINERALS) tablet, Take 1 tablet by mouth daily    Allergies   Allergen Reactions    Penicillins      History reviewed. No pertinent family history. Social History     Tobacco Use   Smoking Status Never Smoker   Smokeless Tobacco Never Used     Social History     Substance and Sexual Activity   Drug Use Never     Social History     Substance and Sexual Activity   Alcohol Use Not Currently     ROS:  Constitutional- No weight loss or fevers  Eyes- No diplopia. No photophobia. Ears/nose/throat- No dysphagia.  No Dysarthria  Cardiovascular- No palpitations. No chest pain  Respiratory- No dyspnea. No Cough  Gastrointestinal- No Abdominal pain. No Vomiting. Genitourinary- No incontinence. No urinary retention  Musculoskeletal- No myalgia. No arthralgia  Skin- No rash. No easy bruising. Psychiatric- No depression. No anxiety  Endocrine- No diabetes. No thyroid issues. Hematologic- No bleeding difficulty. No fatigue  Neurologic- No weakness. No Headache. Exam:  Blood pressure 139/76, pulse 75, temperature 97.9 °F (36.6 °C), temperature source Oral, resp. rate 18, height 5' 6\" (1.676 m), weight 222 lb 14.2 oz (101.1 kg), SpO2 94 %. Constitutional    Vital signs: BP, HR, and RR reviewed   General alert, no distress, well-nourished  Eyes: unable to visualize the fundi  Cardiovascular: no peripheral edema. Psychiatric: cooperative with examination, no psychotic behavior noted. Neurologic  Mental status:   orientation to person, place, time. General fund of knowledge grossly intact   Memory grossly intact   Attention intact as able to attend well to the exam     Language fluent in conversation   Comprehension intact; follows simple commands  Cranial nerves:   CN2: visual fields full   CN 3,4,6: extraocular muscles intact  CN5: facial sensation symmetric   CN7: face symmetric without dysarthria  CN8: hearing grossly intact  CN9: palate elevated symmetrically  CN11: trap full strength on shoulder shrug  CN12: tongue midline with protrusion  Strength: good strength in all 4 extremities   Sensory: light touch intact in all 4 extremities. Cerebellar/coordination: finger nose finger normal without ataxia  Tone: normal in all 4 extremities  Gait: deferred at this time for safety.       Labs  HgA1c 10.3    Glucose 204  Na 140  K 3.7  BUN 14  Cr 1.1        ALT 25  AST 20    Lactic acid 12.6 -> 1.3    WBC 7.2K  Hg 14.1  Platelets 937    ETOH negative    COVID negative    Studies  CT head w/o 2/6/22, independently reviewed  Extensive low-attenuation in the right temporoparietal region could represent   consent represent acute or subacute ischemia   No intracranial hemorrhage identified     CT head MARCH 2021  1.  Ischemic infarct involving the right temporal lobe in the distribution of the right MCA, likely evolving acute/subacute. Punctate dense calcification noted at the right MCA bifurcation, better visualized on concurrently performed CTA, reported separately. 2.   Mild chronic microvascular ischemic white matter disease and remote  posterior left frontal lacunar infarct. MRI brain [facility] 2021  1.  Acute right MCA territory infarct likely posterior division M2 and watershed with mild edema and gyral thickening, but no midline shift. 2.  No definite evidence of intracranial hemorrhage, within the limits of this motion limited exam.     Impression  1. The patient reports a mechanical fall and is relatively certain that he did not lose consciousness(?). Given his hx of temporal lobe stroke, elevated CK/lactic acid, and possibly some confusion per ED reports seizure would be high on the differential.  He has no new focal neurologic deficits so the likelihood of new stroke seems low. The CT head findings appear to be in line w/ previous stroke from March 2021.    2.  Lactic acidosis. 3.  Elevated CK. 4. Hx L MCA stroke. 5.  Uncontrolled DM. Recommendations  1. EEG. 2.  UA, urine drug screen, TSH. Would follow CK to watch for delayed rise. 3.  I don't feel strongly about MRI to look for new stroke or for possible new onset seizure at this point. 4.  Seizure precautions.       Sergei Jaime NP  [address] Neurology    A copy of this note was provided for Dr Joceline Mejias MD

## 2022-02-07 NOTE — PROGRESS NOTES
4 Eyes Skin Assessment     NAME:  Ming Romeo  YOB: 1955  MEDICAL RECORD NUMBER:  [id number]    The patient is being assess for  Admission    I agree that 2 RN's have performed a thorough Head to Toe Skin Assessment on the patient. ALL assessment sites listed below have been assessed. Areas assessed by both nurses:    Head, Face, Ears, Shoulders, Back, Chest, Arms, Elbows, Hands, Sacrum. Buttock, Coccyx, Ischium and Legs. Feet and Heels        Does the Patient have a Wound?  No noted wound(s)       Sriram Prevention initiated:  No   Wound Care Orders initiated:  No    Pressure Injury (Stage 3,4, Unstageable, DTI, NWPT, and Complex wounds) if present place consult order under [de-identified] No    New and Established Ostomies if present place consult order under : No      Nurse 1 eSignature: Electronically signed by Kaitlynn Canela RN on 2/6/22 at 9:51 PM EST    **SHARE this note so that the co-signing nurse is able to place an eSignature**    Nurse 2 eSignature: Electronically signed by Alanna Shelby RN on 2/7/22 at 1:11 AM EST

## 2022-02-07 NOTE — H&P
Hospital Medicine History & Physical      PCP: No primary care provider on file. Date of Admission: 2/6/2022    Chief Complaint: syncope    History Of Present Illness:  Patient is a 69-year-old male with past medical history of diabetes mellitus hypertension who presents to the hospital for possible seizure episode. According to the patient he had a fall after he lost his balance. He denies remembering seizure episode however at time of arrival of the EMS patient was found confused and likely postictal. Patient otherwise denies chest pain shortness of breath nausea vomiting diarrhea constipation dysuria. Past Medical History:          Diagnosis Date    Diabetes mellitus ([address]     Hypertension     Stroke [facility])        Past Surgical History:      History reviewed. No pertinent surgical history. Medications Prior to Admission:      Prior to Admission medications    Medication Sig Start Date End Date Taking? Authorizing Provider   aspirin 325 MG tablet Take 325 mg by mouth daily 8/2/21  Yes Historical Provider, MD   atorvastatin (LIPITOR) 80 MG tablet Take 80 mg by mouth daily 4/12/21  Yes Historical Provider, MD   carvedilol (COREG) 25 MG tablet Take 25 mg by mouth 2 times daily 12/9/21  Yes Historical Provider, MD   glucose 4 g chewable tablet Take 16 g by mouth as needed 5/28/21  Yes Historical Provider, MD   Dulaglutide (TRULICITY) 1.5 IT/3.8BM SOPN Inject 1.5 mg into the skin every 7 days ON TUESDAYS 8/4/21  Yes Historical Provider, MD   hydroCHLOROthiazide (HYDRODIURIL) 25 MG tablet Take 25 mg by mouth daily 9/7/21  Yes Historical Provider, MD   insulin glargine (LANTUS SOLOSTAR) 100 UNIT/ML injection pen INJECT 50 UNITS UNDER THE SKIN EVERY MORNING AND 55 UNITS UNDER THE SKIN EVERY EVENING 12/1/21  Yes Historical Provider, MD   insulin lispro, 1 Unit Dial, 100 UNIT/ML SOPN INJECT UNDER THE SKIN BEFORE EVERY MEAL PER SLIDING SCALE.  MAX OF 65 UNITS DAILY 1/13/22  Yes Historical Provider, MD lisinopril (PRINIVIL;ZESTRIL) 40 MG tablet Take 40 mg by mouth daily 2/22/21  Yes Historical Provider, MD   metFORMIN (GLUCOPHAGE) 1000 MG tablet Take 1,000 mg by mouth 2 times daily 3/8/21  Yes Historical Provider, MD   omeprazole (PRILOSEC) 20 MG delayed release capsule Take 20 mg by mouth 2 times daily as needed 3/1/21  Yes Historical Provider, MD   Multiple Vitamins-Minerals (THERAPEUTIC MULTIVITAMIN-MINERALS) tablet Take 1 tablet by mouth daily   Yes Historical Provider, MD       Allergies:  Penicillins    Social History:      TOBACCO:   reports that he has never smoked. He has never used smokeless tobacco.  ETOH:   reports previous alcohol use. Family History:       Reviewed in detail and non contributory      History reviewed. No pertinent family history. REVIEW OF SYSTEMS:   Pertinent positives as noted in the HPI. All other systems reviewed and negative. PHYSICAL EXAM PERFORMED:    BP (!) 163/77   Pulse 77   Temp 97.6 °F (36.4 °C) (Oral)   Resp 14   Ht 5' 6\" (1.676 m)   Wt 222 lb 14.2 oz (101.1 kg)   SpO2 97%   BMI 35.97 kg/m²     General appearance:  No apparent distress, cooperative. HEENT:  Normal cephalic, atraumatic without obvious deformity. Conjunctivae/corneas clear. Neck: Supple, with full range of motion. No cervical lymphadenopathy  Respiratory:  Normal respiratory effort. Clear to auscultation, bilaterally without Rales/Wheezes/Rhonchi. Cardiovascular:  Regular rate and rhythm with normal S1/S2 without murmurs, rubs or gallops. Abdomen: Soft, non-tender, non-distended, normal bowel sounds. Musculoskeletal:  No edema noted bilaterally. No tenderness on palpation   Skin: no rash visible  Neurologic:  Neurologically intact without any focal sensory/motor deficits. grossly non-focal.  Psychiatric:  Alert and oriented, normal mood  Peripheral Pulses: +2 palpable, equal bilaterally       Labs:     No results for input(s): WBC, HGB, HCT, PLT in the last 72 hours.   Recent Labs 02/06/22  1125 02/06/22  1641   * 135*   K 4.2 3.8   CL 92* 100   CO2 16* 25   BUN 25* 18   CREATININE 1.3 0.9   CALCIUM 9.2 7.8*     Recent Labs     02/06/22  1125   AST 20   ALT 25   BILIDIR <0.2   BILITOT 0.7   ALKPHOS 98     Recent Labs     02/06/22  1125   INR 0.95     Recent Labs     02/06/22  1125   TROPONINI <0.01       Urinalysis:    No results found for: Darrelyn Meals, BACTERIA, RBCUA, BLOODU, SPECGRAV, GLUCOSEU    Radiology:       CT CERVICAL SPINE WO CONTRAST   Final Result      1. No evidence of fracture with multilevel degenerative changes as described. 2.  Mild left sphenoid chronic sinusitis. CT CHEST PULMONARY EMBOLISM W CONTRAST   Final Result      1. No evidence of acute pulmonary emboli. 2.  Otherwise no acute pathology noted. XR CHEST PORTABLE   Final Result   Cardiomegaly with atelectasis/hazy densities in lung bases. Clinical   assessment is recommended to evaluate for viral infection. CT HEAD WO CONTRAST   Final Result   Extensive low-attenuation in the right temporoparietal region could represent   consent represent acute or subacute ischemia      No intracranial hemorrhage identified                 Active Hospital Problems    Diagnosis Date Noted    Syncope and collapse [R55] 02/06/2022       Patient is a 77-year-old male with past medical history of diabetes mellitus hypertension who presents to the hospital for possible seizure episode. According to the patient he had a fall after he lost his balance. He denies remembering seizure episode however at time of arrival of the EMS patient was found confused and likely postictal.  Patient otherwise denies chest pain shortness of breath nausea vomiting diarrhea constipation dysuria.     Assessment  Syncope and collapse, rule out seizure  Lactic acidosis  Acute hypoxia-resolved  Diabetes mellitus    Plan  Seizure precautions, check CK level, monitor lactic acid, gentle IV fluid therapy with normal saline, consult neurology, will defer ordering EEG to neurology  CT head was performed in the emergency department shows extensive low-attenuation in the right temporoparietal region suspected for acute or subacute ischemia, will order MRI brain for better evaluation, PT/OT evaluation, aspirin, statin  Monitor on cardiac telemetry, hypoxia has resolved, currently on room air  Insulin sliding scale  Resume home medication  DVT prophylaxis-Lovenox  Diet: ADULT DIET; Regular  Code Status: Full Code    PT/OT Eval Status: ordered    Dispo - pending clinical improvement       Joceline Mejias MD    The note was completed using EMR and Dragon dictation system. Every effort was made to ensure accuracy; however, inadvertent computerized transcription errors may be present. Thank you No primary care provider on file. for the opportunity to be involved in this patient's care. If you have any questions or concerns please feel free to contact me at [phone].     Joceline Mejias MD

## 2022-02-07 NOTE — SIGNIFICANT EVENT
Per staff, patient is adamantly refusing MRI and further neurologic workup and wanting to be discharged asap, will  for getting workup completed, otherwise will follow pt wishes

## 2022-02-07 NOTE — PROGRESS NOTES
Pt alert oriented x3 provided the MRI checklist pt stating \" I'm not sure if I want to get the MRI or even want to stay I just tripped but its almost as if they are doing the same steps like they did over at [facility] when I had the stroke\". RN discussed diagnostic testing and plan of care, pt refusing MRI at this time. Patient is sitting in chair, call light in reach, and chair alarm engaged. RN will continue to monitor the patient.

## 2022-02-07 NOTE — PLAN OF CARE
Problem: Falls - Risk of:  Goal: Will remain free from falls  Description: Will remain free from falls  Outcome: Ongoing  Goal: Absence of physical injury  Description: Absence of physical injury  Outcome: Ongoing     Problem: SAFETY  Goal: Free from accidental physical injury  Outcome: Ongoing     Problem: DAILY CARE  Goal: Daily care needs are met  Outcome: Ongoing     Problem: SKIN INTEGRITY  Goal: Skin integrity is maintained or improved  Outcome: Ongoing     Problem: KNOWLEDGE DEFICIT  Goal: Patient/S.O. demonstrates understanding of disease process, treatment plan, medications, and discharge instructions.   Outcome: Ongoing

## 2022-02-08 VITALS
BODY MASS INDEX: 35.68 KG/M2 | RESPIRATION RATE: 16 BRPM | WEIGHT: 222 LBS | SYSTOLIC BLOOD PRESSURE: 161 MMHG | HEART RATE: 68 BPM | OXYGEN SATURATION: 95 % | DIASTOLIC BLOOD PRESSURE: 73 MMHG | HEIGHT: 66 IN | TEMPERATURE: 98.1 F

## 2022-02-08 LAB
ANION GAP SERPL CALCULATED.3IONS-SCNC: 12 MMOL/L (ref 3–16)
BUN BLDV-MCNC: 15 MG/DL (ref 7–20)
CALCIUM SERPL-MCNC: 8.9 MG/DL (ref 8.3–10.6)
CHLORIDE BLD-SCNC: 101 MMOL/L (ref 99–110)
CO2: 26 MMOL/L (ref 21–32)
CREAT SERPL-MCNC: 0.9 MG/DL (ref 0.8–1.3)
GFR AFRICAN AMERICAN: >60
GFR NON-AFRICAN AMERICAN: >60
GLUCOSE BLD-MCNC: 218 MG/DL (ref 70–99)
GLUCOSE BLD-MCNC: 244 MG/DL (ref 70–99)
GLUCOSE BLD-MCNC: 252 MG/DL (ref 70–99)
GLUCOSE BLD-MCNC: 318 MG/DL (ref 70–99)
HCT VFR BLD CALC: 42 % (ref 40.5–52.5)
HEMOGLOBIN: 14.1 G/DL (ref 13.5–17.5)
MCH RBC QN AUTO: 32.2 PG (ref 26–34)
MCHC RBC AUTO-ENTMCNC: 33.5 G/DL (ref 31–36)
MCV RBC AUTO: 96 FL (ref 80–100)
PDW BLD-RTO: 13.7 % (ref 12.4–15.4)
PERFORMED ON: ABNORMAL
PLATELET # BLD: 182 K/UL (ref 135–450)
PMV BLD AUTO: 8.7 FL (ref 5–10.5)
POTASSIUM REFLEX MAGNESIUM: 3.8 MMOL/L (ref 3.5–5.1)
RBC # BLD: 4.37 M/UL (ref 4.2–5.9)
SODIUM BLD-SCNC: 139 MMOL/L (ref 136–145)
WBC # BLD: 5.8 K/UL (ref 4–11)

## 2022-02-08 PROCEDURE — G0378 HOSPITAL OBSERVATION PER HR: HCPCS

## 2022-02-08 PROCEDURE — 94660 CPAP INITIATION&MGMT: CPT

## 2022-02-08 PROCEDURE — 94761 N-INVAS EAR/PLS OXIMETRY MLT: CPT

## 2022-02-08 PROCEDURE — 80048 BASIC METABOLIC PNL TOTAL CA: CPT

## 2022-02-08 PROCEDURE — 95816 EEG AWAKE AND DROWSY: CPT

## 2022-02-08 PROCEDURE — 97535 SELF CARE MNGMENT TRAINING: CPT

## 2022-02-08 PROCEDURE — 2700000000 HC OXYGEN THERAPY PER DAY

## 2022-02-08 PROCEDURE — 97530 THERAPEUTIC ACTIVITIES: CPT

## 2022-02-08 PROCEDURE — 6370000000 HC RX 637 (ALT 250 FOR IP): Performed by: INTERNAL MEDICINE

## 2022-02-08 PROCEDURE — 97110 THERAPEUTIC EXERCISES: CPT

## 2022-02-08 PROCEDURE — 85027 COMPLETE CBC AUTOMATED: CPT

## 2022-02-08 PROCEDURE — 36415 COLL VENOUS BLD VENIPUNCTURE: CPT

## 2022-02-08 PROCEDURE — 2580000003 HC RX 258: Performed by: INTERNAL MEDICINE

## 2022-02-08 RX ADMIN — HYDROCHLOROTHIAZIDE 25 MG: 25 TABLET ORAL at 08:28

## 2022-02-08 RX ADMIN — Medication 10 ML: at 08:29

## 2022-02-08 RX ADMIN — CARVEDILOL 25 MG: 25 TABLET, FILM COATED ORAL at 08:28

## 2022-02-08 RX ADMIN — ASPIRIN 325 MG ORAL TABLET 325 MG: 325 PILL ORAL at 08:28

## 2022-02-08 RX ADMIN — INSULIN LISPRO 4 UNITS: 100 INJECTION, SOLUTION INTRAVENOUS; SUBCUTANEOUS at 08:27

## 2022-02-08 RX ADMIN — INSULIN LISPRO 6 UNITS: 100 INJECTION, SOLUTION INTRAVENOUS; SUBCUTANEOUS at 11:32

## 2022-02-08 RX ADMIN — CARVEDILOL 25 MG: 25 TABLET, FILM COATED ORAL at 17:51

## 2022-02-08 RX ADMIN — PANTOPRAZOLE SODIUM 40 MG: 40 TABLET, DELAYED RELEASE ORAL at 06:18

## 2022-02-08 RX ADMIN — LISINOPRIL 40 MG: 40 TABLET ORAL at 08:28

## 2022-02-08 RX ADMIN — INSULIN LISPRO 8 UNITS: 100 INJECTION, SOLUTION INTRAVENOUS; SUBCUTANEOUS at 17:52

## 2022-02-08 RX ADMIN — ATORVASTATIN CALCIUM 80 MG: 80 TABLET, FILM COATED ORAL at 08:28

## 2022-02-08 RX ADMIN — INSULIN LISPRO 3 UNITS: 100 INJECTION, SOLUTION INTRAVENOUS; SUBCUTANEOUS at 18:38

## 2022-02-08 ASSESSMENT — PAIN SCALES - GENERAL: PAINLEVEL_OUTOF10: 0

## 2022-02-08 NOTE — PROCEDURES
[address] [address]                          ELECTROENCEPHALOGRAM REPORT    PATIENT NAME: Jackie Moncada               :        1955  MED REC NO:   [id number]                          ROOM:       4116  ACCOUNT NO:   [de-identified]                           ADMIT DATE: 2022  PROVIDER:     Chaim Taylor DO    DATE OF EE2022    REFERRING PROVIDER:  Jose A Carr NP    REASON FOR STUDY:  Seizure. BRIEF HISTORY AND NEUROLOGIC FINDINGS:  The patient is a 58-year-old  male being evaluated for reported concern for seizure. MEDICATIONS:  The patient's medications listed did not include any  centrally acting medications. EEG FINDINGS:  This is a 20-channel digital EEG performed utilizing  bipolar and referential montages. Wakefulness, drowsiness and sleep  were obtained during the recording. During maximum wakefulness, there  was a moderate voltage, symmetric, fairly well-regulated and reactive  9-10 Hz posterior background rhythm. The anterior background consists  of low voltage fast frequencies. Drowsiness is manifested by  attenuation of the waking background rhythms. Sleep was manifested by  sleep spindles and K-complexes. Photic stimulation was performed at various flash frequencies and evoked  a posterior driving response at a few isolated frequencies. Hyperventilation exercise was also performed and produced minimal  background slowing. A 1-channel EKG rhythm strip was reviewed and showed no obvious cardiac  dysrhythmias. EEG DIAGNOSIS:  Essentially normal awake and asleep EEG. CLINICAL INTERPRETATION:  No definite epileptiform activity or evidence  for focal cerebral dysfunction was present during this recording. If  seizures remain a clinical concern, then a repeat EEG may be of further  benefit. Clinical correlation is advised.         JUAN DAWN DO    D: 02/08/2022 16:43:22       T: 02/08/2022 16:45:37     [id number]  Job#: [id number]     Doc#: [id number]    CC:

## 2022-02-08 NOTE — DISCHARGE INSTR - DIET

## 2022-02-08 NOTE — DISCHARGE SUMMARY
Hospital Medicine Discharge Summary    Patient ID: Wendel Cushing      Patient's PCP: No primary care provider on file. Admit Date: 2/6/2022     Discharge Date:     Admitting Physician: Edward Barnes MD     Discharge Physician: Edward Barnes MD     Discharge Diagnoses: Active Hospital Problems    Diagnosis Date Noted    Syncope and collapse [R55] 02/06/2022       The patient was seen and examined on day of discharge and this discharge summary is in conjunction with any daily progress note from day of discharge. Condition at discharge - stable    Hospital Course: patient seen and evaluated on the day of discharge. Patient informed about following up with appointments. Patient verbalized understanding for follow-up appointments. The patient and / or the family were informed of the results of tests, a time was given to answer questions, a plan was proposed and they agreed with plan. Medical reconciliation performed. Patient discharged stable condition.       Patient is a 75-year-old male with past medical history of diabetes mellitus hypertension who presents to the hospital for possible seizure episode.  According to the patient he had a fall after he lost his balance.  He denies remembering seizure episode however at time of arrival of the EMS patient was found confused and likely postictal.  Patient otherwise denies chest pain shortness of breath nausea vomiting diarrhea constipation dysuria.     Assessment  Syncope and collapse, rule out seizure  Lactic acidosis  Acute hypoxia-resolved  Diabetes mellitus     Plan  Continue IV fluids given mild rhabdo, consulted neurology, will defer ordering EEG to neurology, PT/OT recs reviewed  CT head was performed in the emergency department shows extensive low-attenuation in the right temporoparietal region suspected for acute or subacute ischemia, will order MRI brain for better evaluation, PT/OT evaluation, aspirin, statin  Monitor on cardiac telemetry, hypoxia has resolved, currently on room air  Insulin sliding scale  Resume home medication  DVT prophylaxis-Lovenox  DVT Prophylaxis:  Diet: ADULT DIET; Regular  Code Status: Full Code     PT/OT Eval Status: ordered          Exam:     BP (!) 161/73   Pulse 68   Temp 98.1 °F (36.7 °C) (Axillary)   Resp 16   Ht 5' 6\" (1.676 m)   Wt 222 lb (100.7 kg)   SpO2 95%   BMI 35.83 kg/m²     General appearance: No apparent distress  HEENT:  Conjunctivae/corneas clear. Neck: Supple, No jugular venous distention. Respiratory:  Normal respiratory effort. Clear to auscultation, bilaterally without Rales/Wheezes/Rhonchi. Cardiovascular: Regular rate and rhythm with normal S1/S2 without murmurs, rubs or gallops. Abdomen: Soft, non-tender, non-distended, normal bowel sounds. Musculoskelatal: No clubbing, cyanosis or edema bilaterally. Skin: Skin color, texture, turgor normal.   Neurologic: no focal neurologic deficits. grossly non-focal.  Psychiatric: Alert and oriented, normal mood    Consults:     IP CONSULT TO DIABETES EDUCATOR  IP CONSULT TO NEUROLOGY      Code Status:  Full Code    Activity: activity as tolerated    Labs:  For convenience and continuity at follow-up the following most recent labs are provided:      CBC:    Lab Results   Component Value Date    WBC 5.8 02/08/2022    HGB 14.1 02/08/2022    HCT 42.0 02/08/2022     02/08/2022       Renal:    Lab Results   Component Value Date     02/08/2022    K 3.8 02/08/2022     02/08/2022    CO2 26 02/08/2022    BUN 15 02/08/2022    CREATININE 0.9 02/08/2022    CALCIUM 8.9 02/08/2022       Discharge Medications:     Current Discharge Medication List           Details   aspirin 325 MG tablet Take 325 mg by mouth daily      atorvastatin (LIPITOR) 80 MG tablet Take 80 mg by mouth daily      carvedilol (COREG) 25 MG tablet Take 25 mg by mouth 2 times daily      glucose 4 g chewable tablet Take 16 g by mouth as needed      Dulaglutide (TRULICITY) 1.5 IX/8.2WH SOPN Inject 1.5 mg into the skin every 7 days ON TUESDAYS      hydroCHLOROthiazide (HYDRODIURIL) 25 MG tablet Take 25 mg by mouth daily      insulin glargine (LANTUS SOLOSTAR) 100 UNIT/ML injection pen INJECT 50 UNITS UNDER THE SKIN EVERY MORNING AND 55 UNITS UNDER THE SKIN EVERY EVENING      insulin lispro, 1 Unit Dial, 100 UNIT/ML SOPN INJECT UNDER THE SKIN BEFORE EVERY MEAL PER SLIDING SCALE. MAX OF 65 UNITS DAILY      lisinopril (PRINIVIL;ZESTRIL) 40 MG tablet Take 40 mg by mouth daily      metFORMIN (GLUCOPHAGE) 1000 MG tablet Take 1,000 mg by mouth 2 times daily      omeprazole (PRILOSEC) 20 MG delayed release capsule Take 20 mg by mouth 2 times daily as needed      Multiple Vitamins-Minerals (THERAPEUTIC MULTIVITAMIN-MINERALS) tablet Take 1 tablet by mouth daily             Time Spent on discharge is more than 30 mints in the examination, evaluation, counseling and review of medications and discharge plan. Signed:    Erick Curry MD   2/8/2022      Thank you No primary care provider on file. for the opportunity to be involved in this patient's care. If you have any questions or concerns please feel free to contact me at [phone].

## 2022-02-08 NOTE — DISCHARGE INSTR - COC
Continuity of Care Form    Patient Name: Rocael Edwards   :  1955  MRN:  [id number]    Admit date:  2022  Discharge date:  ***    Code Status Order: Full Code   Advance Directives:      Admitting Physician:  Christopher Baird MD  PCP: No primary care provider on file. Discharging Nurse: [facility] Unit/Room#: [id number]-[id number]/9665-90  Discharging Unit Phone Number: ***    Emergency Contact:   Extended Emergency Contact Information  Primary Emergency Contact: Awilda Marx  Home Phone: [phone]  Relation: Other   needed? No  Secondary Emergency Contact: [address] Phone: [phone]  Relation: Other  Preferred language: English    Past Surgical History:  History reviewed. No pertinent surgical history.     Immunization History:   Immunization History   Administered Date(s) Administered    COVID-19, Pfizer Purple top, DILUTE for use, 12+ yrs, 30mcg/0.3mL dose 2021, 2021, 10/09/2021    Tdap (Boostrix, Adacel) 2022       Active Problems:  Patient Active Problem List   Diagnosis Code    Syncope and collapse R55       Isolation/Infection:   Isolation            No Isolation          Patient Infection Status       Infection Onset Added Last Indicated Last Indicated By Review Planned Expiration Resolved Resolved By    None active    Resolved    COVID-19 (Rule Out) 22 COVID-19, Rapid (Ordered)   22 Rule-Out Test Resulted            Nurse Assessment:  Last Vital Signs: BP (!) 161/73   Pulse 68   Temp 98.1 °F (36.7 °C) (Axillary)   Resp 16   Ht 5' 6\" (1.676 m)   Wt 222 lb (100.7 kg)   SpO2 95%   BMI 35.83 kg/m²     Last documented pain score (0-10 scale): Pain Level: 0  Last Weight:   Wt Readings from Last 1 Encounters:   22 222 lb (100.7 kg)     Mental Status:  {IP PT MENTAL STATUS:}    IV Access:  [facility] DIRK IV ACCESS:625880400}    Nursing Mobility/ADLs:  Walking   {P DME ZTYN:871993961}  Transfer  {P DME MYTA:772981709}  Bathing  {CHP DME KLUL:385313383}  Dressing  {CHP DME BGT}  Toileting  {CHP DME WDRL:797773468}  Feeding  {CHP DME DAPE:907315777}  Med Admin  {CHP DME XRDM:405957087}  Med Delivery   { DIRK MED Delivery:305397476}    Wound Care Documentation and Therapy:        Elimination:  Continence: Bowel: {YES / SR:68174}  Bladder: {YES / SJ:86220}  Urinary Catheter: {Urinary Catheter:288489477}   Colostomy/Ileostomy/Ileal Conduit: {YES / ZO:15583}       Date of Last BM: ***    Intake/Output Summary (Last 24 hours) at 2022  Last data filed at 2022 1753  Gross per 24 hour   Intake 910 ml   Output --   Net 910 ml     I/O last 3 completed shifts:   In: 26 [P.O.:540; I.V.:10]  Out: -     Safety Concerns:     [address] [employer] Safety Concerns:073430604}    Impairments/Disabilities:      [address] [employer] Impairments/Disabilities:099385754}    Nutrition Therapy:  Current Nutrition Therapy:   [address] [employer] Diet List:622420973}    Routes of Feeding: {[facility] DME Other Feedings:571200685}  Liquids: {Slp liquid thickness:52868}  Daily Fluid Restriction: {P DME Yes amt example:353409297}  Last Modified Barium Swallow with Video (Video Swallowing Test): {Done Not Done HVZF:284714626}    Treatments at the Time of Hospital Discharge:   Respiratory Treatments: ***  Oxygen Therapy:  {Therapy; copd oxygen:19626}  Ventilator:    {[facility] Vent QZML:195790498}    Rehab Therapies: {THERAPEUTIC INTERVENTION:2444461062}  Weight Bearing Status/Restrictions: [address] [employer] Weight Bearin}  Other Medical Equipment (for information only, NOT a DME order):  {EQUIPMENT:450743207}  Other Treatments: ***    Patient's personal belongings (please select all that are sent with patient):  {[facility] DME Belongings:036616122}    RN SIGNATURE:  {Esignature:567722270}    CASE MANAGEMENT/SOCIAL WORK SECTION    Inpatient Status Date: ***    Readmission Risk Assessment Score:  Readmission Risk              Risk of Unplanned Readmission:  13 Discharging to Facility/ Agency   Name:   Address:  Phone:  Fax:    Dialysis Facility (if applicable)   Name:  Address:  Dialysis Schedule:  Phone:  Fax:    / signature: {Esignature:103094360}    PHYSICIAN SECTION    Prognosis: {Prognosis:7039781134}    Condition at Discharge: Lamin Moran Patient Condition:957744768}    Rehab Potential (if transferring to Rehab): {Prognosis:4628690835}    Recommended Labs or Other Treatments After Discharge: ***    Physician Certification: I certify the above information and transfer of Yesi [place]  is necessary for the continuing treatment of the diagnosis listed and that he requires {Admit to Appropriate Level of Care:51694} for {GREATER/LESS:539787465} 30 days.      Update Admission H&P: {CHP DME Changes in CVEZO:405282741}    PHYSICIAN SIGNATURE:  {Esignature:481531824}

## 2022-02-08 NOTE — PROGRESS NOTES
Occupational Therapy  Facility/Department: [address] MED SURG  Daily Treatment Note  This note to serve as discharge summary if pt d/c'd prior to next session    NAME: Ming Romeo  : 1955  MRN: [id number]    Date of Service: 2022    Discharge Recommendations:  Home with assist PRN       Assessment   Performance deficits / Impairments: Decreased functional mobility ; Decreased ADL status; Decreased safe awareness;Decreased high-level IADLs;Decreased endurance  Assessment: Discussed with OTR: Pt tolerated session well. Pt today transferring without device, with Supervision, and amb in room, hallway, SBA, no LOB, yet moves quickly is impulsive at times. Pt Mod I for toileting tasks, Supervision for dressing LB, as cued for safety techn (sitting to don pants, underclothes, vs in stance). Anticipate pt will be able to return home with assist prn. Pt declines need for HHOT. Will continue with poc while in hospital to maximize function/safety in ADL's, transfers, mob. Social/Functional History  Lives With: Alone  Type of Home: Apartment (9th floor apartment)  Home Layout: One level (laundry on 8th floor, but takes laundry to [facility] most of the time)  Home Access: Elevator,Level entry  Bathroom Shower/Tub: Tub/Shower unit  Bathroom Toilet: Standard  Bathroom Accessibility: Walker accessible  Home Equipment: Cane (BiPAP)--reports has a shower bench, but does not fit bathtub. ADL Assistance: Independent  Homemaking Assistance: Independent  Ambulation Assistance: Independent (with no AD)  Transfer Assistance: Independent  Active : No  Patient's  Info: friends  Mode of Transportation: Bus  Occupation: Part time employment  Type of occupation: prepares taxes  IADL Comments: pt sleeps on air mattress  Additional Comments: Pt denies any other recent falls. Prognosis: Good  History: Pt is a 69 y.o. male admitted with Syncope and collapse, rule out seizure, Lactic acidosis.  At baseline, pt lives alone in an apartment, independent ADLs, IADLs, and fxl mobility with no AD. OT Education: OT Role;Plan of Care;ADL Adaptive Strategies;Transfer Training;IADL Safety  Barriers to Learning: hearing  REQUIRES OT FOLLOW UP: Yes  Activity Tolerance  Activity Tolerance: Patient Tolerated treatment well  Safety Devices  Safety Devices in place: Yes  Type of devices: Call light within reach; Left in chair;Nurse notified;Gait belt         Patient Diagnosis(es): The primary encounter diagnosis was Syncope and collapse. Diagnoses of Hypoxia, Lactic acidosis, and Type 2 diabetes mellitus with hyperglycemia, with long-term current use of insulin ([facility] [address] [facility] were also pertinent to this visit. has a past medical history of Diabetes mellitus ([facility] Hypertension, and Stroke ([facility] has no past surgical history on file. Restrictions  Restrictions/Precautions  Restrictions/Precautions: Up as Tolerated,Seizure,Fall Risk  Subjective   General  Chart Reviewed: Sarah Fisher  Patient assessed for rehabilitation services?: Yes  Additional Pertinent Hx: Per Leonor Suarez MD's H&P: \"Patient is a 64-year-old male with past medical history of diabetes mellitus hypertension who presents to the hospital for possible seizure episode. According to the patient he had a fall after he lost his balance. He denies remembering seizure episode however at time of arrival of the EMS patient was found confused and likely postictal. Patient otherwise denies chest pain shortness of breath nausea vomiting diarrhea constipation dysuria. \" CT head: \"Extensive low-attenuation in the right temporoparietal region could represent consent represent acute or subacute ischemia. No intracranial hemorrhage identified \"  Family / Caregiver Present: No  Referring Practitioner: Leonor Suarez MD  Diagnosis: Syncope and collapse, rule out seizure, Lactic acidosis  Subjective  Subjective: Pt seen BS, in recliner. Pt agreeable to OT.  Pt denied pain. Orientation  Orientation  Overall Orientation Status: Within Functional Limits  Objective    ADL  Grooming: Modified independent  (stance at sink to wash hands, brush teeth.)  UE Bathing: Unable to assess(comment)  LE Bathing: Unable to assess(comment)  UE Dressing: Unable to assess(comment)  LE Dressing: Supervision;Verbal cueing (changed mesh underwear (d/t soiled) and donned new pair of hospital pants. Cues to sit to thead clothes)  Toileting: Modified independent   Additional Comments: Pt declined bathing, hoping to go home today and bathe. Standing Balance  Time: 8-10 min  Activity: ADL's, functional mob, no device. Functional Mobility  Functional - Mobility Device: No device  Activity: To/from bathroom  Assist Level: Stand by assistance  Functional Mobility Comments: Pt amb in room, and in hallway, close SBA in hallway. Pt with min sway, no LOB. Pt moves quickly, is impulsive. Toilet Transfers  Toilet - Technique: Ambulating  Equipment Used: Grab bars  Toilet Transfer: Supervision  Toilet Transfers Comments: no device, moves quickly to sit, stand  Wheelchair Bed Transfers  Wheelchair/Bed - Technique: Ambulating  Equipment Used:  (recliner)  Level of Asssistance: Stand by [facility] Transfers Comments: no device, moves quickly to sit, stand. Cognition  Overall Cognitive Status: WFL  Safety Judgement: Decreased awareness of need for safety  Cognition Comment: Is [place] and impulsive. Does state he calls nursing for assistance. Type of ROM/Therapeutic Exercise  Type of ROM/Therapeutic Exercise: Resistive Bands  Comment: Red theraband ex's issued and completed x10 reps for 6 ex's (4 of 6 completed in stance at recliner).          Plan   Plan  Times per week: 3-5  Current Treatment Recommendations: Balance Training,Functional Mobility Training,Safety Education & Training,Self-Care / ADL,Endurance Training,Equipment Evaluation, Education, & procurement,Neuromuscular Re-education    AM-PAC Score        AM-PAC Inpatient Daily Activity Raw Score: 22 (02/08/22 1529)  AM-PAC Inpatient ADL T-Scale Score : 47.1 (02/08/22 1529)  ADL Inpatient CMS 0-100% Score: 25.8 (02/08/22 1529)  ADL Inpatient CMS G-Code Modifier : CJ (02/08/22 1529)    Goals  Short term goals  Time Frame for Short term goals: Prior to d/c: Goals ongoing, except where noted below  Short term goal 1: Pt will bathe with mod I. Short term goal 2: Pt will dress with mod I. Short term goal 3: Pt will toilet with mod I.-goal met  Short term goal 4: Pt will complete fxl mobility and fxl transfers to/from ADL surfaces with mod I. Short term goal 5: Pt will tolerate standing >10 minutes for functional task with mod I.  Long term goals  Time Frame for Long term goals : STGs=LTGs  Patient Goals   Patient goals : to return home.        Therapy Time   Individual Concurrent Group Co-treatment   Time In 1447         Time Out 1535         Minutes [address] [place]/L,515

## 2022-02-08 NOTE — PLAN OF CARE
Problem: Falls - Risk of:  Goal: Will remain free from falls  Description: Will remain free from falls  2/8/2022 0003 by Joseline Brooke RN  Outcome: Ongoing    Goal: Absence of physical injury  Description: Absence of physical injury  2/8/2022 0003 by Joseline Brooke RN  Outcome: Ongoing       Problem: SAFETY  Goal: Free from accidental physical injury  2/8/2022 0003 by Joseline Brooke RN  Outcome: Ongoing       Problem: DAILY CARE  Goal: Daily care needs are met  2/8/2022 0003 by Joseline Brooke RN  Outcome: Ongoing    Problem: SKIN INTEGRITY  Goal: Skin integrity is maintained or improved  2/8/2022 0003 by Joseline Brooke RN  Outcome: Ongoing       Problem: KNOWLEDGE DEFICIT  Goal: Patient/S.O. demonstrates understanding of disease process, treatment plan, medications, and discharge instructions.   2/8/2022 0003 by Joseline Brooke RN  Outcome: Ongoing

## 2022-02-11 ENCOUNTER — TELEPHONE (OUTPATIENT)
Dept: PHARMACY | Age: 67
End: 2022-02-11

## 2022-02-11 NOTE — TELEPHONE ENCOUNTER
New Diabetes referral from the hospitalist.  Recommended by the Diabetes Educator. Reached out to schedule initial appointment. Left a message to please return my call.      Di Cowden, PharmD  [address] [place] [address]  Diabetes Service  [phone]

## 2022-02-15 ENCOUNTER — TELEPHONE (OUTPATIENT)
Dept: PHARMACY | Age: 67
End: 2022-02-15

## 2022-02-15 NOTE — TELEPHONE ENCOUNTER
2nd attempt    New Diabetes referral from the hospitalist.  Recommended by the Diabetes Educator. Reached out to schedule initial appointment. Left a message to please return my call.      Bienvenido Mcduffie, PharmD  [address] [place] [address]  Diabetes Service  [phone]

## 2022-02-18 ENCOUNTER — TELEPHONE (OUTPATIENT)
Dept: PHARMACY | Age: 67
End: 2022-02-18

## 2022-02-18 NOTE — TELEPHONE ENCOUNTER
3rd attempt    New Diabetes referral from the hospitalist.  Recommended by the Diabetes Educator. Reached out to schedule initial appointment. Left a message to please return my call.      Will close referral.     Priyanka Saini, PharmD  [address] [place] [address]  Diabetes Service  [phone]